# Patient Record
Sex: MALE | Race: WHITE | HISPANIC OR LATINO | Employment: OTHER | URBAN - METROPOLITAN AREA
[De-identification: names, ages, dates, MRNs, and addresses within clinical notes are randomized per-mention and may not be internally consistent; named-entity substitution may affect disease eponyms.]

---

## 2023-09-27 ENCOUNTER — ANESTHESIA (INPATIENT)
Dept: PERIOP | Facility: HOSPITAL | Age: 58
DRG: 263 | End: 2023-09-27
Payer: COMMERCIAL

## 2023-09-27 ENCOUNTER — APPOINTMENT (OUTPATIENT)
Dept: RADIOLOGY | Facility: HOSPITAL | Age: 58
DRG: 263 | End: 2023-09-27
Payer: COMMERCIAL

## 2023-09-27 ENCOUNTER — ANESTHESIA EVENT (INPATIENT)
Dept: PERIOP | Facility: HOSPITAL | Age: 58
DRG: 263 | End: 2023-09-27
Payer: COMMERCIAL

## 2023-09-27 ENCOUNTER — OFFICE VISIT (OUTPATIENT)
Dept: URGENT CARE | Facility: CLINIC | Age: 58
End: 2023-09-27

## 2023-09-27 ENCOUNTER — HOSPITAL ENCOUNTER (INPATIENT)
Facility: HOSPITAL | Age: 58
LOS: 1 days | Discharge: HOME | DRG: 263 | End: 2023-09-28
Attending: EMERGENCY MEDICINE | Admitting: SURGERY
Payer: COMMERCIAL

## 2023-09-27 ENCOUNTER — APPOINTMENT (EMERGENCY)
Dept: CT IMAGING | Facility: HOSPITAL | Age: 58
DRG: 263 | End: 2023-09-27
Payer: COMMERCIAL

## 2023-09-27 VITALS
TEMPERATURE: 99 F | DIASTOLIC BLOOD PRESSURE: 72 MMHG | RESPIRATION RATE: 16 BRPM | HEART RATE: 57 BPM | SYSTOLIC BLOOD PRESSURE: 152 MMHG | OXYGEN SATURATION: 97 %

## 2023-09-27 DIAGNOSIS — K81.0 ACUTE CHOLECYSTITIS: Primary | ICD-10-CM

## 2023-09-27 DIAGNOSIS — R93.5 ABNORMAL CT OF THE ABDOMEN: ICD-10-CM

## 2023-09-27 DIAGNOSIS — E11.9 TYPE 2 DIABETES MELLITUS WITHOUT COMPLICATION, WITHOUT LONG-TERM CURRENT USE OF INSULIN (HCC): ICD-10-CM

## 2023-09-27 DIAGNOSIS — I10 HYPERTENSION, UNSPECIFIED TYPE: ICD-10-CM

## 2023-09-27 DIAGNOSIS — R07.9 CHEST PAIN, UNSPECIFIED TYPE: Primary | ICD-10-CM

## 2023-09-27 DIAGNOSIS — N28.89 RENAL MASS: ICD-10-CM

## 2023-09-27 DIAGNOSIS — R10.11 RIGHT UPPER QUADRANT ABDOMINAL PAIN: ICD-10-CM

## 2023-09-27 DIAGNOSIS — K80.20 CHOLELITHIASES: ICD-10-CM

## 2023-09-27 DIAGNOSIS — M54.6 ACUTE MIDLINE THORACIC BACK PAIN: ICD-10-CM

## 2023-09-27 LAB
2HR DELTA HS TROPONIN: -1 NG/L
ALBUMIN SERPL BCP-MCNC: 4.5 G/DL (ref 3.5–5)
ALP SERPL-CCNC: 111 U/L (ref 34–104)
ALT SERPL W P-5'-P-CCNC: 24 U/L (ref 7–52)
ANION GAP SERPL CALCULATED.3IONS-SCNC: 9 MMOL/L
AST SERPL W P-5'-P-CCNC: 20 U/L (ref 13–39)
ATRIAL RATE: 51 BPM
ATRIAL RATE: 62 BPM
BASOPHILS # BLD AUTO: 0.06 THOUSANDS/ÂΜL (ref 0–0.1)
BASOPHILS NFR BLD AUTO: 0 % (ref 0–1)
BILIRUB SERPL-MCNC: 0.56 MG/DL (ref 0.2–1)
BUN SERPL-MCNC: 13 MG/DL (ref 5–25)
CALCIUM SERPL-MCNC: 9.9 MG/DL (ref 8.4–10.2)
CARDIAC TROPONIN I PNL SERPL HS: 10 NG/L
CARDIAC TROPONIN I PNL SERPL HS: 9 NG/L
CHLORIDE SERPL-SCNC: 102 MMOL/L (ref 96–108)
CO2 SERPL-SCNC: 27 MMOL/L (ref 21–32)
CREAT SERPL-MCNC: 0.78 MG/DL (ref 0.6–1.3)
EOSINOPHIL # BLD AUTO: 0.04 THOUSAND/ÂΜL (ref 0–0.61)
EOSINOPHIL NFR BLD AUTO: 0 % (ref 0–6)
ERYTHROCYTE [DISTWIDTH] IN BLOOD BY AUTOMATED COUNT: 11.8 % (ref 11.6–15.1)
GFR SERPL CREATININE-BSD FRML MDRD: 100 ML/MIN/1.73SQ M
GLUCOSE SERPL-MCNC: 159 MG/DL (ref 65–140)
GLUCOSE SERPL-MCNC: 162 MG/DL (ref 65–140)
GLUCOSE SERPL-MCNC: 180 MG/DL (ref 65–140)
HCT VFR BLD AUTO: 51.1 % (ref 36.5–49.3)
HGB BLD-MCNC: 17.7 G/DL (ref 12–17)
IMM GRANULOCYTES # BLD AUTO: 0.1 THOUSAND/UL (ref 0–0.2)
IMM GRANULOCYTES NFR BLD AUTO: 1 % (ref 0–2)
LYMPHOCYTES # BLD AUTO: 1.14 THOUSANDS/ÂΜL (ref 0.6–4.47)
LYMPHOCYTES NFR BLD AUTO: 6 % (ref 14–44)
MCH RBC QN AUTO: 31.5 PG (ref 26.8–34.3)
MCHC RBC AUTO-ENTMCNC: 34.6 G/DL (ref 31.4–37.4)
MCV RBC AUTO: 91 FL (ref 82–98)
MONOCYTES # BLD AUTO: 0.84 THOUSAND/ÂΜL (ref 0.17–1.22)
MONOCYTES NFR BLD AUTO: 4 % (ref 4–12)
NEUTROPHILS # BLD AUTO: 16.96 THOUSANDS/ÂΜL (ref 1.85–7.62)
NEUTS SEG NFR BLD AUTO: 89 % (ref 43–75)
NRBC BLD AUTO-RTO: 0 /100 WBCS
P AXIS: 62 DEGREES
P AXIS: 72 DEGREES
PLATELET # BLD AUTO: 255 THOUSANDS/UL (ref 149–390)
PMV BLD AUTO: 9.1 FL (ref 8.9–12.7)
POTASSIUM SERPL-SCNC: 3.7 MMOL/L (ref 3.5–5.3)
PR INTERVAL: 156 MS
PR INTERVAL: 156 MS
PROT SERPL-MCNC: 7.9 G/DL (ref 6.4–8.4)
QRS AXIS: 11 DEGREES
QRS AXIS: 179 DEGREES
QRSD INTERVAL: 142 MS
QRSD INTERVAL: 148 MS
QT INTERVAL: 462 MS
QT INTERVAL: 470 MS
QTC INTERVAL: 433 MS
QTC INTERVAL: 468 MS
RBC # BLD AUTO: 5.62 MILLION/UL (ref 3.88–5.62)
SODIUM SERPL-SCNC: 138 MMOL/L (ref 135–147)
T WAVE AXIS: 21 DEGREES
T WAVE AXIS: 36 DEGREES
VENTRICULAR RATE: 51 BPM
VENTRICULAR RATE: 62 BPM
WBC # BLD AUTO: 19.14 THOUSAND/UL (ref 4.31–10.16)

## 2023-09-27 PROCEDURE — 71260 CT THORAX DX C+: CPT

## 2023-09-27 PROCEDURE — 93010 ELECTROCARDIOGRAM REPORT: CPT | Performed by: INTERNAL MEDICINE

## 2023-09-27 PROCEDURE — 74177 CT ABD & PELVIS W/CONTRAST: CPT

## 2023-09-27 PROCEDURE — 47562 LAPAROSCOPIC CHOLECYSTECTOMY: CPT | Performed by: SURGERY

## 2023-09-27 PROCEDURE — 88304 TISSUE EXAM BY PATHOLOGIST: CPT | Performed by: SPECIALIST

## 2023-09-27 PROCEDURE — 93005 ELECTROCARDIOGRAM TRACING: CPT | Performed by: PHYSICIAN ASSISTANT

## 2023-09-27 PROCEDURE — 0FT44ZZ RESECTION OF GALLBLADDER, PERCUTANEOUS ENDOSCOPIC APPROACH: ICD-10-PCS | Performed by: SURGERY

## 2023-09-27 PROCEDURE — 85025 COMPLETE CBC W/AUTO DIFF WBC: CPT | Performed by: EMERGENCY MEDICINE

## 2023-09-27 PROCEDURE — 96374 THER/PROPH/DIAG INJ IV PUSH: CPT

## 2023-09-27 PROCEDURE — G0383 LEV 4 HOSP TYPE B ED VISIT: HCPCS | Performed by: PHYSICIAN ASSISTANT

## 2023-09-27 PROCEDURE — 82948 REAGENT STRIP/BLOOD GLUCOSE: CPT

## 2023-09-27 PROCEDURE — 99285 EMERGENCY DEPT VISIT HI MDM: CPT

## 2023-09-27 PROCEDURE — 47562 LAPAROSCOPIC CHOLECYSTECTOMY: CPT | Performed by: PHYSICIAN ASSISTANT

## 2023-09-27 PROCEDURE — 71046 X-RAY EXAM CHEST 2 VIEWS: CPT

## 2023-09-27 PROCEDURE — 93005 ELECTROCARDIOGRAM TRACING: CPT

## 2023-09-27 PROCEDURE — 80053 COMPREHEN METABOLIC PANEL: CPT | Performed by: EMERGENCY MEDICINE

## 2023-09-27 PROCEDURE — 99222 1ST HOSP IP/OBS MODERATE 55: CPT | Performed by: SURGERY

## 2023-09-27 PROCEDURE — 36415 COLL VENOUS BLD VENIPUNCTURE: CPT

## 2023-09-27 PROCEDURE — 84484 ASSAY OF TROPONIN QUANT: CPT | Performed by: EMERGENCY MEDICINE

## 2023-09-27 PROCEDURE — 96375 TX/PRO/DX INJ NEW DRUG ADDON: CPT

## 2023-09-27 RX ORDER — MAGNESIUM HYDROXIDE 1200 MG/15ML
LIQUID ORAL AS NEEDED
Status: DISCONTINUED | OUTPATIENT
Start: 2023-09-27 | End: 2023-09-27 | Stop reason: HOSPADM

## 2023-09-27 RX ORDER — HYDROMORPHONE HCL/PF 1 MG/ML
SYRINGE (ML) INJECTION AS NEEDED
Status: DISCONTINUED | OUTPATIENT
Start: 2023-09-27 | End: 2023-09-27

## 2023-09-27 RX ORDER — TRAMADOL HYDROCHLORIDE 50 MG/1
50 TABLET ORAL EVERY 6 HOURS PRN
Status: DISCONTINUED | OUTPATIENT
Start: 2023-09-27 | End: 2023-09-28 | Stop reason: HOSPADM

## 2023-09-27 RX ORDER — PROPOFOL 10 MG/ML
INJECTION, EMULSION INTRAVENOUS AS NEEDED
Status: DISCONTINUED | OUTPATIENT
Start: 2023-09-27 | End: 2023-09-27

## 2023-09-27 RX ORDER — LABETALOL HYDROCHLORIDE 5 MG/ML
10 INJECTION, SOLUTION INTRAVENOUS ONCE
Status: DISCONTINUED | OUTPATIENT
Start: 2023-09-27 | End: 2023-09-27 | Stop reason: HOSPADM

## 2023-09-27 RX ORDER — HEPARIN SODIUM 5000 [USP'U]/ML
5000 INJECTION, SOLUTION INTRAVENOUS; SUBCUTANEOUS EVERY 8 HOURS SCHEDULED
Status: DISCONTINUED | OUTPATIENT
Start: 2023-09-27 | End: 2023-09-28 | Stop reason: HOSPADM

## 2023-09-27 RX ORDER — OXYCODONE HYDROCHLORIDE 5 MG/1
5 TABLET ORAL EVERY 4 HOURS PRN
Status: DISCONTINUED | OUTPATIENT
Start: 2023-09-27 | End: 2023-09-28 | Stop reason: HOSPADM

## 2023-09-27 RX ORDER — ACETAMINOPHEN 325 MG/1
650 TABLET ORAL EVERY 6 HOURS SCHEDULED
Status: DISCONTINUED | OUTPATIENT
Start: 2023-09-27 | End: 2023-09-28 | Stop reason: HOSPADM

## 2023-09-27 RX ORDER — ONDANSETRON 2 MG/ML
4 INJECTION INTRAMUSCULAR; INTRAVENOUS ONCE AS NEEDED
Status: DISCONTINUED | OUTPATIENT
Start: 2023-09-27 | End: 2023-09-27 | Stop reason: HOSPADM

## 2023-09-27 RX ORDER — SODIUM CHLORIDE, SODIUM LACTATE, POTASSIUM CHLORIDE, CALCIUM CHLORIDE 600; 310; 30; 20 MG/100ML; MG/100ML; MG/100ML; MG/100ML
75 INJECTION, SOLUTION INTRAVENOUS CONTINUOUS
Status: DISCONTINUED | OUTPATIENT
Start: 2023-09-27 | End: 2023-09-28 | Stop reason: HOSPADM

## 2023-09-27 RX ORDER — FENTANYL CITRATE 50 UG/ML
INJECTION, SOLUTION INTRAMUSCULAR; INTRAVENOUS AS NEEDED
Status: DISCONTINUED | OUTPATIENT
Start: 2023-09-27 | End: 2023-09-27

## 2023-09-27 RX ORDER — AMLODIPINE BESYLATE 10 MG/1
10 TABLET ORAL DAILY
COMMUNITY

## 2023-09-27 RX ORDER — CEFAZOLIN SODIUM 2 G/50ML
2000 SOLUTION INTRAVENOUS
Status: COMPLETED | OUTPATIENT
Start: 2023-09-28 | End: 2023-09-27

## 2023-09-27 RX ORDER — HYDROMORPHONE HCL/PF 1 MG/ML
0.5 SYRINGE (ML) INJECTION EVERY 4 HOURS PRN
Status: DISCONTINUED | OUTPATIENT
Start: 2023-09-27 | End: 2023-09-28 | Stop reason: HOSPADM

## 2023-09-27 RX ORDER — AMLODIPINE BESYLATE 10 MG/1
10 TABLET ORAL DAILY
Status: DISCONTINUED | OUTPATIENT
Start: 2023-09-28 | End: 2023-09-28 | Stop reason: HOSPADM

## 2023-09-27 RX ORDER — MIDAZOLAM HYDROCHLORIDE 2 MG/2ML
INJECTION, SOLUTION INTRAMUSCULAR; INTRAVENOUS AS NEEDED
Status: DISCONTINUED | OUTPATIENT
Start: 2023-09-27 | End: 2023-09-27

## 2023-09-27 RX ORDER — ATORVASTATIN CALCIUM 10 MG/1
10 TABLET, FILM COATED ORAL DAILY
COMMUNITY

## 2023-09-27 RX ORDER — LIDOCAINE HYDROCHLORIDE 10 MG/ML
INJECTION, SOLUTION EPIDURAL; INFILTRATION; INTRACAUDAL; PERINEURAL AS NEEDED
Status: DISCONTINUED | OUTPATIENT
Start: 2023-09-27 | End: 2023-09-27

## 2023-09-27 RX ORDER — BUPIVACAINE HYDROCHLORIDE 2.5 MG/ML
INJECTION, SOLUTION EPIDURAL; INFILTRATION; INTRACAUDAL AS NEEDED
Status: DISCONTINUED | OUTPATIENT
Start: 2023-09-27 | End: 2023-09-27 | Stop reason: HOSPADM

## 2023-09-27 RX ORDER — LABETALOL HYDROCHLORIDE 5 MG/ML
INJECTION, SOLUTION INTRAVENOUS AS NEEDED
Status: DISCONTINUED | OUTPATIENT
Start: 2023-09-27 | End: 2023-09-27

## 2023-09-27 RX ORDER — DOCUSATE SODIUM 100 MG/1
100 CAPSULE, LIQUID FILLED ORAL 2 TIMES DAILY
Status: DISCONTINUED | OUTPATIENT
Start: 2023-09-27 | End: 2023-09-28 | Stop reason: HOSPADM

## 2023-09-27 RX ORDER — ONDANSETRON 2 MG/ML
INJECTION INTRAMUSCULAR; INTRAVENOUS AS NEEDED
Status: DISCONTINUED | OUTPATIENT
Start: 2023-09-27 | End: 2023-09-27

## 2023-09-27 RX ORDER — MORPHINE SULFATE 4 MG/ML
4 INJECTION, SOLUTION INTRAMUSCULAR; INTRAVENOUS ONCE
Status: COMPLETED | OUTPATIENT
Start: 2023-09-27 | End: 2023-09-27

## 2023-09-27 RX ORDER — HYDROMORPHONE HCL/PF 1 MG/ML
1 SYRINGE (ML) INJECTION ONCE
Status: COMPLETED | OUTPATIENT
Start: 2023-09-27 | End: 2023-09-27

## 2023-09-27 RX ORDER — ONDANSETRON 2 MG/ML
4 INJECTION INTRAMUSCULAR; INTRAVENOUS EVERY 6 HOURS PRN
Status: DISCONTINUED | OUTPATIENT
Start: 2023-09-27 | End: 2023-09-28 | Stop reason: HOSPADM

## 2023-09-27 RX ORDER — OXYCODONE HYDROCHLORIDE 10 MG/1
10 TABLET ORAL EVERY 4 HOURS PRN
Status: DISCONTINUED | OUTPATIENT
Start: 2023-09-27 | End: 2023-09-28 | Stop reason: HOSPADM

## 2023-09-27 RX ORDER — HYDRALAZINE HYDROCHLORIDE 20 MG/ML
5 INJECTION INTRAMUSCULAR; INTRAVENOUS EVERY 6 HOURS PRN
Status: DISCONTINUED | OUTPATIENT
Start: 2023-09-27 | End: 2023-09-28 | Stop reason: HOSPADM

## 2023-09-27 RX ORDER — HYDROMORPHONE HCL/PF 1 MG/ML
0.5 SYRINGE (ML) INJECTION
Status: DISCONTINUED | OUTPATIENT
Start: 2023-09-27 | End: 2023-09-27 | Stop reason: HOSPADM

## 2023-09-27 RX ORDER — FENTANYL CITRATE/PF 50 MCG/ML
50 SYRINGE (ML) INJECTION
Status: DISCONTINUED | OUTPATIENT
Start: 2023-09-27 | End: 2023-09-27 | Stop reason: HOSPADM

## 2023-09-27 RX ORDER — ROCURONIUM BROMIDE 10 MG/ML
INJECTION, SOLUTION INTRAVENOUS AS NEEDED
Status: DISCONTINUED | OUTPATIENT
Start: 2023-09-27 | End: 2023-09-27

## 2023-09-27 RX ADMIN — ROCURONIUM BROMIDE 40 MG: 10 INJECTION, SOLUTION INTRAVENOUS at 17:19

## 2023-09-27 RX ADMIN — SODIUM CHLORIDE, SODIUM LACTATE, POTASSIUM CHLORIDE, AND CALCIUM CHLORIDE: .6; .31; .03; .02 INJECTION, SOLUTION INTRAVENOUS at 17:00

## 2023-09-27 RX ADMIN — HEPARIN SODIUM 5000 UNITS: 5000 INJECTION INTRAVENOUS; SUBCUTANEOUS at 22:16

## 2023-09-27 RX ADMIN — HEPARIN SODIUM 5000 UNITS: 5000 INJECTION INTRAVENOUS; SUBCUTANEOUS at 17:04

## 2023-09-27 RX ADMIN — ROCURONIUM BROMIDE 10 MG: 10 INJECTION, SOLUTION INTRAVENOUS at 17:36

## 2023-09-27 RX ADMIN — PROPOFOL 50 MG: 10 INJECTION, EMULSION INTRAVENOUS at 17:38

## 2023-09-27 RX ADMIN — SODIUM CHLORIDE, SODIUM LACTATE, POTASSIUM CHLORIDE, AND CALCIUM CHLORIDE: .6; .31; .03; .02 INJECTION, SOLUTION INTRAVENOUS at 18:00

## 2023-09-27 RX ADMIN — HYDROMORPHONE HYDROCHLORIDE 0.5 MG: 1 INJECTION, SOLUTION INTRAMUSCULAR; INTRAVENOUS; SUBCUTANEOUS at 17:37

## 2023-09-27 RX ADMIN — LIDOCAINE HYDROCHLORIDE 40 MG: 10 INJECTION, SOLUTION EPIDURAL; INFILTRATION; INTRACAUDAL; PERINEURAL at 17:19

## 2023-09-27 RX ADMIN — LABETALOL HYDROCHLORIDE 5 MG: 5 INJECTION, SOLUTION INTRAVENOUS at 17:41

## 2023-09-27 RX ADMIN — MIDAZOLAM HYDROCHLORIDE 2 MG: 1 INJECTION, SOLUTION INTRAMUSCULAR; INTRAVENOUS at 17:10

## 2023-09-27 RX ADMIN — OXYCODONE HYDROCHLORIDE 5 MG: 5 TABLET ORAL at 20:02

## 2023-09-27 RX ADMIN — ACETAMINOPHEN 650 MG: 325 TABLET, FILM COATED ORAL at 23:50

## 2023-09-27 RX ADMIN — ONDANSETRON 4 MG: 2 INJECTION INTRAMUSCULAR; INTRAVENOUS at 17:28

## 2023-09-27 RX ADMIN — SUGAMMADEX 200 MG: 100 INJECTION, SOLUTION INTRAVENOUS at 17:57

## 2023-09-27 RX ADMIN — PROPOFOL 50 MG: 10 INJECTION, EMULSION INTRAVENOUS at 17:52

## 2023-09-27 RX ADMIN — IOHEXOL 100 ML: 350 INJECTION, SOLUTION INTRAVENOUS at 11:27

## 2023-09-27 RX ADMIN — FENTANYL CITRATE 100 MCG: 50 INJECTION, SOLUTION INTRAMUSCULAR; INTRAVENOUS at 17:19

## 2023-09-27 RX ADMIN — MORPHINE SULFATE 4 MG: 4 INJECTION INTRAVENOUS at 11:22

## 2023-09-27 RX ADMIN — LABETALOL HYDROCHLORIDE 5 MG: 5 INJECTION, SOLUTION INTRAVENOUS at 17:49

## 2023-09-27 RX ADMIN — CEFAZOLIN SODIUM 2000 MG: 2 SOLUTION INTRAVENOUS at 17:22

## 2023-09-27 RX ADMIN — HYDROMORPHONE HYDROCHLORIDE 1 MG: 1 INJECTION, SOLUTION INTRAMUSCULAR; INTRAVENOUS; SUBCUTANEOUS at 11:59

## 2023-09-27 RX ADMIN — PROPOFOL 200 MG: 10 INJECTION, EMULSION INTRAVENOUS at 17:19

## 2023-09-27 NOTE — ANESTHESIA POSTPROCEDURE EVALUATION
Post-Op Assessment Note    CV Status:  Stable  Pain Score: 0    Pain management: adequate     Mental Status:  Alert and awake   Hydration Status:  Euvolemic   PONV Controlled:  Controlled   Airway Patency:  Patent and adequate   Two or more mitigation strategies used for obstructive sleep apnea   Post Op Vitals Reviewed: Yes      Staff: CRNA         No notable events documented.     BP  155/73   Temp   98.2   Pulse 80   Resp 20   SpO2 100

## 2023-09-27 NOTE — TELEMEDICINE
e-Consult ([facility])  - Interventional Radiology  Augustus Snider 63 y.o. male MRN: [de-identified]  Unit/Bed#: [facility] Encounter: [id number]          Interventional Radiology has been consulted to evaluate Augustus Snider    We were consulted by general surgery concerning this patient with acute cholecystitis. Inpatient Consult to IR  Consult performed by: Angie Marquez MD  Consult ordered by: Audra García PA-C        09/27/23    Assessment/Recommendation:   55-year-old male who was awakened in the middle of the night with epigastric pain radiating to the right upper quadrant. Imaging obtained upon arrival to the emergency department demonstrates a stone in the neck of the gallbladder with some distention of the gallbladder and a minimal amount of stranding in the pericholecystic fat. The gallbladder wall does not appear thickened, however. In light of his elevated white blood cell count which is above 19,000, the diagnosis of acute cholecystitis is highly likely secondary to this obstructing gallbladder neck stone. The patient is noted to have a left renal mass as well as several densely sclerotic bone lesions. Surgery was initially concerned about metastatic renal cell carcinoma, however, renal cell carcinoma produces lytic bone lesions due to its high rate of growth. This patient very likely has a left renal cell carcinoma, but based on this imaging study it is probably a stage II. Sclerotic bone lesions are more likely secondary to a more slowly growing cancer, most likely a prostate cancer, or possibly multiple bone islands. This was discussed with Dr. Joy Drummond, and I recommended against a cholecystostomy in this patient with acute symptoms since he likely has a much longer life expectancy than a patient with stage IV renal cell carcinoma. Dr. Joy Drummond was in agreement. 11-20 minutes, >50% of the total time devoted to medical consultative verbal/EMR discussion between providers.  Written report will be generated in the EMR. Thank you for allowing Interventional Radiology to participate in the care of Kristina Cisneros. Please don't hesitate to call or TigerText us with any questions.      Rach Mcrae MD

## 2023-09-27 NOTE — PROGRESS NOTES
[facility]        NAME: Marionette Aschoff is a 63 y.o. male  : 1965    MRN: [id number]  DATE: 2023  TIME: 10:07 AM    Assessment and Plan   Chest pain, unspecified type [R07.9]  1. Chest pain, unspecified type  Transfer to other facility    ECG 12 lead      2. Acute midline thoracic back pain  Transfer to other facility    ECG 12 lead      3. Right upper quadrant abdominal pain  Transfer to other facility    ECG 12 lead            Patient Instructions     Your EKG was abnormal and I have no prior EKGs to compare. I recommend going to the nearest emergency department for further care and evaluation. Patient declined ambulance at this time and will have his daughter drive him to the [address] [facility]. Chief Complaint     Chief Complaint   Patient presents with   • Back Pain     Started this morning. No SOB or difficulty breathing. Lower back pain radiating to chest. Denies pain radiating to neck or left arm. History of Present Illness       Patient is a 81-year-old German-speaking male visiting from [facility] who is presenting with his daughter and complaint of mid thoracic pain that radiates to the chest since 3 AM this morning. Patient describes the pain as aching and dull, localized to the chest, not alleviated with ibuprofen. Associated symptoms is nausea. The patient denies shortness of breath diaphoresis dyspnea, cough, tachypnea, fatigue, numbness, paresthesia, weakness, vomiting, dizziness, lightheadedness. Patient has a past medical history of DM 2, hypertension, and dyslipidemia. Review of Systems   Review of Systems   Constitutional: Negative for activity change, appetite change, chills, fatigue and fever. HENT: Negative for congestion. Eyes: Negative for visual disturbance. Respiratory: Negative for cough and wheezing. Cardiovascular: Negative for palpitations. Gastrointestinal: Positive for abdominal pain. Musculoskeletal: Negative for arthralgias and myalgias. Skin: Negative for color change and pallor. Neurological: Negative for facial asymmetry. Current Medications       Current Outpatient Medications:   •  amLODIPine (NORVASC) 10 mg tablet, Take 10 mg by mouth daily, Disp: , Rfl:   •  atorvastatin (LIPITOR) 10 mg tablet, Take 10 mg by mouth daily, Disp: , Rfl:   •  metFORMIN (GLUCOPHAGE) 500 mg tablet, Take 500 mg by mouth 2 (two) times a day with meals, Disp: , Rfl:     Current Allergies     Allergies as of 09/27/2023   • (No Known Allergies)            The following portions of the patient's history were reviewed and updated as appropriate: allergies, current medications, past family history, past medical history, past social history, past surgical history and problem list.     Past Medical History:   Diagnosis Date   • Diabetes type 2, controlled ([address])    • Hyperlipidemia    • Hypertension        History reviewed. No pertinent surgical history. History reviewed. No pertinent family history. Medications have been verified. Objective   /72   Pulse 57   Temp 99 °F (37.2 °C)   Resp 16   SpO2 97%        Physical Exam     Physical Exam  Vitals and nursing note reviewed. Constitutional:       General: He is not in acute distress. Appearance: Normal appearance. He is ill-appearing. He is not toxic-appearing. HENT:      Nose: Nose normal.      Mouth/Throat:      Mouth: Mucous membranes are moist.      Pharynx: Oropharynx is clear. Eyes:      General: No scleral icterus. Extraocular Movements: Extraocular movements intact. Conjunctiva/sclera: Conjunctivae normal.      Pupils: Pupils are equal, round, and reactive to light. Cardiovascular:      Rate and Rhythm: Normal rate. Rhythm irregular. Pulses: Normal pulses. Heart sounds: Normal heart sounds. Pulmonary:      Effort: Pulmonary effort is normal. No respiratory distress.       Breath sounds: Normal breath sounds. Abdominal:      General: Abdomen is flat. Bowel sounds are normal.      Palpations: Abdomen is soft. There is no mass. Tenderness: There is abdominal tenderness. There is no right CVA tenderness, left CVA tenderness, guarding or rebound. Musculoskeletal:         General: Tenderness present. Normal range of motion. Cervical back: Normal range of motion and neck supple. Skin:     General: Skin is warm and dry. Findings: No erythema or lesion. Neurological:      General: No focal deficit present. Mental Status: He is alert and oriented to person, place, and time. Coordination: Coordination normal.      Gait: Gait normal.   Psychiatric:         Mood and Affect: Mood normal.         Behavior: Behavior normal.         Thought Content: Thought content normal.         Judgment: Judgment normal.         ECG: RBBB with PACs, vent at 62 bpm, NAD noted.

## 2023-09-27 NOTE — H&P
H&P Exam - General Surgery   Val Renner 63 y.o. male MRN: [de-identified]  Unit/Bed#: Z1H1 Encounter: [id number]    Assessment/Plan     Assessment:  71-year-old male with acute cholecystitis  -Imaging shows prominent gallstone in the gallbladder neck with gallbladder distention and questionable pericholecystic stranding surrounding the gallbladder neck    Solid Left renal cyst measuring 3.7 cm which is consistent with renal cell carcinoma  -Imaging shows scattered sclerotic foci seen throughout the osseous structures which could represent metastatic disease    Diabetes    Hypertension    Hyperlipidemia    BPH    Plan:  -We will admit under surgical services  -Consult interventional radiology for evaluation of images with distended gallbladder   -Consult urology for further recommendations on renal cyst and possible metastatic disease  -Pending on urology and IR recommendations, will consider tentative laparoscopic cholecystectomy versus percutaneous cholecystostomy tube placement  -Patient is currently visiting from [facility], so once patient is stabilized from acute cholecystitis patient will be allowed to travel back to seek surgical and medical attention back in his home country  -Start IV antibiotics  -Analgesics and antiemetics as needed  -Abdominal exams  -Contine to monitor vitals and lab results    History of Present Illness     HPI:  Val Renner is a 63 y.o. male past medical history hypertension, diabetes, hyperlipidemia, BPH, who presents to the emergency department complaining of acute onset of epigastric pain radiating to the right upper quadrant associated with nausea that began around 3 AM.  Patient reports taking ibuprofen which did not help alleviate the symptoms. Patient presented to urgent care where an EKG was done and patient was referred to the emergency department for further evaluation. Review of Systems   Constitutional: Negative for chills, fatigue and fever.    HENT: Negative for congestion, sore throat and trouble swallowing. Eyes: Negative for photophobia, redness and visual disturbance. Respiratory: Negative for apnea, cough and wheezing. Cardiovascular: Negative for chest pain, palpitations and leg swelling. Gastrointestinal: Negative for abdominal distention, abdominal pain and anal bleeding. Endocrine: Negative for polydipsia, polyphagia and polyuria. Genitourinary: Negative for dysuria, frequency, hematuria and urgency. Musculoskeletal: Negative for arthralgias, back pain and gait problem. Skin: Negative for color change, pallor and rash. Neurological: Negative for dizziness, seizures and numbness. Psychiatric/Behavioral: Negative for agitation, behavioral problems and confusion. Historical Information   Past Medical History:   Diagnosis Date   • Diabetes type 2, controlled ([address])    • Hyperlipidemia    • Hypertension      History reviewed. No pertinent surgical history.   Social History   Social History     Substance and Sexual Activity   Alcohol Use Yes    Comment: socially only     Social History     Substance and Sexual Activity   Drug Use Not on file     Social History     Tobacco Use   Smoking Status Some Days   • Types: Cigarettes   Smokeless Tobacco Never     E-Cigarette/Vaping   • E-Cigarette Use Never User      E-Cigarette/Vaping Substances     Family History: non-contributory    Meds/Allergies   all medications and allergies reviewed  No Known Allergies    Objective   First Vitals:   Blood Pressure: (!) 177/73 (09/27/23 1023)  Pulse: (!) 53 (09/27/23 1023)  Temperature: 98.5 °F (36.9 °C) (09/27/23 1023)  Respirations: 16 (09/27/23 1023)  Height: 5' 10.47" (179 cm) (09/27/23 1023)  SpO2: 99 % (09/27/23 1023)    Current Vitals:   Blood Pressure: 131/69 (09/27/23 1314)  Pulse: 75 (09/27/23 1314)  Temperature: 98.5 °F (36.9 °C) (09/27/23 1023)  Respirations: 16 (09/27/23 1314)  Height: 5' 10.47" (179 cm) (09/27/23 1023)  SpO2: 97 % (09/27/23 1314)    No intake or output data in the 24 hours ending 09/27/23 1432    Invasive Devices     Peripheral Intravenous Line  Duration           Peripheral IV 09/27/23 Distal;Left;Upper;Ventral (anterior) Arm <1 day                Physical Exam  Constitutional:       Appearance: Normal appearance. HENT:      Head: Normocephalic. Nose: Nose normal.      Mouth/Throat:      Mouth: Mucous membranes are moist.   Eyes:      Pupils: Pupils are equal, round, and reactive to light. Cardiovascular:      Rate and Rhythm: Normal rate and regular rhythm. Pulmonary:      Effort: Pulmonary effort is normal.      Breath sounds: Normal breath sounds. Abdominal:      General: Bowel sounds are normal.      Tenderness: There is abdominal tenderness. There is guarding. Musculoskeletal:         General: Normal range of motion. Cervical back: Normal range of motion and neck supple. Skin:     General: Skin is warm. Capillary Refill: Capillary refill takes less than 2 seconds. Neurological:      General: No focal deficit present. Mental Status: He is alert and oriented to person, place, and time. Psychiatric:         Mood and Affect: Mood normal.         Thought Content: Thought content normal.         Judgment: Judgment normal.         Lab Results:   I have personally reviewed pertinent lab results.   , CBC:   Lab Results   Component Value Date    WBC 19.14 (H) 09/27/2023    HGB 17.7 (H) 09/27/2023    HCT 51.1 (H) 09/27/2023    MCV 91 09/27/2023     09/27/2023    RBC 5.62 09/27/2023    MCH 31.5 09/27/2023    MCHC 34.6 09/27/2023    RDW 11.8 09/27/2023    MPV 9.1 09/27/2023    NRBC 0 09/27/2023   , CMP:   Lab Results   Component Value Date    SODIUM 138 09/27/2023    K 3.7 09/27/2023     09/27/2023    CO2 27 09/27/2023    BUN 13 09/27/2023    CREATININE 0.78 09/27/2023    CALCIUM 9.9 09/27/2023    AST 20 09/27/2023    ALT 24 09/27/2023    ALKPHOS 111 (H) 09/27/2023    EGFR 100 09/27/2023 Imaging: I have personally reviewed pertinent reports. and I have personally reviewed pertinent films in PACS  EKG, Pathology, and Other Studies: I have personally reviewed pertinent reports. and I have personally reviewed pertinent films in PACS    Code Status: No Order  Advance Directive and Living Will:      Power of :    POLST:      Counseling / Coordination of Care  Total floor / unit time spent today 30 minutes. Greater than 50% of total time was spent with the patient and / or family counseling and / or coordination of care. A description of the counseling / coordination of care: Plan of care and expectations.

## 2023-09-27 NOTE — ANESTHESIA PREPROCEDURE EVALUATION
Procedure:  CHOLECYSTECTOMY LAPAROSCOPIC (Abdomen)    Relevant Problems   CARDIO   (+) Primary hypertension      ENDO   (+) Type 2 diabetes mellitus without complication (HCC)      Digestive   (+) Acute cholecystitis        Physical Exam    Airway    Mallampati score: II  TM Distance: >3 FB  Neck ROM: full     Dental   No notable dental hx     Cardiovascular  Rhythm: regular, Rate: normal, Cardiovascular exam normal    Pulmonary  Pulmonary exam normal Breath sounds clear to auscultation, No rhonchi, No wheezes, No rales,     Other Findings        Anesthesia Plan  ASA Score- 2 Emergent    Anesthesia Type- general with ASA Monitors. Additional Monitors:   Airway Plan: ETT. Plan Factors-Exercise tolerance (METS): >4 METS. EKG reviewed. Imaging results reviewed. Existing labs reviewed. Patient summary reviewed. Patient is not a current smoker. Patient did not smoke on day of surgery. Obstructive sleep apnea risk education given perioperatively. Induction- intravenous. Postoperative Plan-     Informed Consent- Anesthetic plan and risks discussed with patient, spouse and son (With ). I personally reviewed this patient with the CRNA. Discussed and agreed on the Anesthesia Plan with the CRNA. Reddy Zambrano

## 2023-09-27 NOTE — ED PROVIDER NOTES
History  Chief Complaint   Patient presents with   • Chest Pain     Pt with chest pain and right sided back pain that started this morning, was sent over by urgent care. Patient is a 43-year-old male with a past medical history of type 2 diabetes, hyperlipidemia and hypertension presented to the emergency department for evaluation of epigastric pain. Patient states he woke up around 2 AM this morning with epigastric pain radiating to his right upper quadrant. Patient states he feels as though it is radiating from his back. Patient does state he has chronic back pain but states this pain feels worse. Patient states he did attempt to take an analgesic this morning but vomited up shortly after. Patient states he did drink some water this morning and did not note any change in his pain. Denies fevers, chills, rash, headache, weakness, dizziness, visual changes, diarrhea, constipation, shortness of breath or difficulty breathing. Does not offer any other concerns or complaints. Prior to Admission Medications   Prescriptions Last Dose Informant Patient Reported? Taking? amLODIPine (NORVASC) 10 mg tablet   Yes No   Sig: Take 10 mg by mouth daily   atorvastatin (LIPITOR) 10 mg tablet   Yes No   Sig: Take 10 mg by mouth daily   metFORMIN (GLUCOPHAGE) 500 mg tablet   Yes No   Sig: Take 500 mg by mouth 2 (two) times a day with meals      Facility-Administered Medications: None       Past Medical History:   Diagnosis Date   • Diabetes type 2, controlled ([address])    • Hyperlipidemia    • Hypertension        History reviewed. No pertinent surgical history. History reviewed. No pertinent family history. I have reviewed and agree with the history as documented.     E-Cigarette/Vaping   • E-Cigarette Use Never User      E-Cigarette/Vaping Substances     Social History     Tobacco Use   • Smoking status: Some Days     Types: Cigarettes   • Smokeless tobacco: Never   Vaping Use   • Vaping Use: Never used Substance Use Topics   • Alcohol use: Yes     Comment: socially only       Review of Systems   Constitutional: Negative for chills and fever. HENT: Negative for ear pain and sore throat. Eyes: Negative for pain and visual disturbance. Respiratory: Negative for cough and shortness of breath. Cardiovascular: Negative for chest pain and palpitations. Gastrointestinal: Positive for abdominal pain (epigastric) and vomiting. Negative for diarrhea and nausea. Genitourinary: Negative for dysuria and hematuria. Musculoskeletal: Positive for back pain. Negative for arthralgias. Skin: Negative for color change and rash. Neurological: Negative for seizures and syncope. All other systems reviewed and are negative. Physical Exam  Physical Exam  Vitals and nursing note reviewed. Constitutional:       General: He is not in acute distress. Appearance: Normal appearance. He is well-developed. He is not toxic-appearing or diaphoretic. HENT:      Head: Normocephalic and atraumatic. Right Ear: External ear normal.      Left Ear: External ear normal.      Nose: Nose normal.      Mouth/Throat:      Mouth: Mucous membranes are moist.   Eyes:      General: No scleral icterus. Right eye: No discharge. Left eye: No discharge. Conjunctiva/sclera: Conjunctivae normal.   Cardiovascular:      Rate and Rhythm: Normal rate and regular rhythm. Heart sounds: No murmur heard. Pulmonary:      Effort: Pulmonary effort is normal. No respiratory distress. Breath sounds: Normal breath sounds. Abdominal:      Palpations: Abdomen is soft. Tenderness: There is abdominal tenderness in the epigastric area. Musculoskeletal:         General: No swelling, deformity or signs of injury. Normal range of motion. Cervical back: Normal range of motion and neck supple. No rigidity. Back:    Skin:     General: Skin is warm and dry.       Capillary Refill: Capillary refill takes less than 2 seconds. Coloration: Skin is not jaundiced. Findings: No erythema or rash. Neurological:      General: No focal deficit present. Mental Status: He is alert and oriented to person, place, and time. Mental status is at baseline. Cranial Nerves: No cranial nerve deficit. Gait: Gait normal.   Psychiatric:         Mood and Affect: Mood normal.         Behavior: Behavior normal.         Thought Content:  Thought content normal.         Judgment: Judgment normal.         Vital Signs  ED Triage Vitals   Temperature Pulse Respirations Blood Pressure SpO2   09/27/23 1023 09/27/23 1023 09/27/23 1023 09/27/23 1023 09/27/23 1023   98.5 °F (36.9 °C) (!) 53 16 (!) 177/73 99 %      Temp src Heart Rate Source Patient Position - Orthostatic VS BP Location FiO2 (%)   -- 09/27/23 1314 09/27/23 1150 09/27/23 1150 --    Monitor Lying Left arm       Pain Score       09/27/23 1122       10 - Worst Possible Pain           Vitals:    09/27/23 1023 09/27/23 1150 09/27/23 1314   BP: (!) 177/73 (!) 182/61 131/69   Pulse: (!) 53 64 75   Patient Position - Orthostatic VS:  Lying Lying         Visual Acuity      ED Medications  Medications   morphine injection 4 mg (4 mg Intravenous Given 9/27/23 1122)   iohexol (OMNIPAQUE) 350 MG/ML injection (MULTI-DOSE) 100 mL (100 mL Intravenous Given 9/27/23 1127)   HYDROmorphone (DILAUDID) injection 1 mg (1 mg Intravenous Given 9/27/23 1159)       Diagnostic Studies  Results Reviewed     Procedure Component Value Units Date/Time    HS Troponin I 2hr [[id number]]  (Normal) Collected: 09/27/23 1210    Lab Status: Final result Specimen: Blood from Arm, Left Updated: 09/27/23 1249     hs TnI 2hr 9 ng/L      Delta 2hr hsTnI -1 ng/L     HS Troponin I 4hr [[id number]]     Lab Status: No result Specimen: Blood     HS Troponin 0hr (reflex protocol) [[id number]]  (Normal) Collected: 09/27/23 1025    Lab Status: Final result Specimen: Blood from Arm, Right Updated: 09/27/23 1101 hs TnI 0hr 10 ng/L     Comprehensive metabolic panel [[id number]]  (Abnormal) Collected: 09/27/23 1025    Lab Status: Final result Specimen: Blood from Arm, Right Updated: 09/27/23 1055     Sodium 138 mmol/L      Potassium 3.7 mmol/L      Chloride 102 mmol/L      CO2 27 mmol/L      ANION GAP 9 mmol/L      BUN 13 mg/dL      Creatinine 0.78 mg/dL      Glucose 162 mg/dL      Calcium 9.9 mg/dL      AST 20 U/L      ALT 24 U/L      Alkaline Phosphatase 111 U/L      Total Protein 7.9 g/dL      Albumin 4.5 g/dL      Total Bilirubin 0.56 mg/dL      eGFR 100 ml/min/1.73sq m     Narrative:      National Kidney Disease Foundation guidelines for Chronic Kidney Disease (CKD):   •  Stage 1 with normal or high GFR (GFR > 90 mL/min/1.73 square meters)  •  Stage 2 Mild CKD (GFR = 60-89 mL/min/1.73 square meters)  •  Stage 3A Moderate CKD (GFR = 45-59 mL/min/1.73 square meters)  •  Stage 3B Moderate CKD (GFR = 30-44 mL/min/1.73 square meters)  •  Stage 4 Severe CKD (GFR = 15-29 mL/min/1.73 square meters)  •  Stage 5 End Stage CKD (GFR <15 mL/min/1.73 square meters)  Note: GFR calculation is accurate only with a steady state creatinine    CBC and differential [[id number]]  (Abnormal) Collected: 09/27/23 1025    Lab Status: Final result Specimen: Blood from Arm, Right Updated: 09/27/23 1033     WBC 19.14 Thousand/uL      RBC 5.62 Million/uL      Hemoglobin 17.7 g/dL      Hematocrit 51.1 %      MCV 91 fL      MCH 31.5 pg      MCHC 34.6 g/dL      RDW 11.8 %      MPV 9.1 fL      Platelets 811 Thousands/uL      nRBC 0 /100 WBCs      Neutrophils Relative 89 %      Immat GRANS % 1 %      Lymphocytes Relative 6 %      Monocytes Relative 4 %      Eosinophils Relative 0 %      Basophils Relative 0 %      Neutrophils Absolute 16.96 Thousands/µL      Immature Grans Absolute 0.10 Thousand/uL      Lymphocytes Absolute 1.14 Thousands/µL      Monocytes Absolute 0.84 Thousand/µL      Eosinophils Absolute 0.04 Thousand/µL      Basophils Absolute 0.06 Thousands/µL                  CT chest abdomen pelvis w contrast   Final Result by Joshua Cabral MD (09/27 1300)      Prominent gallstone in the gallbladder neck with gallbladder distention and suggestion of questionable pericholecystic stranding surrounding the gallbladder neck. In this patient with right upper quadrant abdominal pain further investigation with right    upper quadrant ultrasound is recommended. Incidental discovery of cystic and solid 3.7 cm exophytic mid to lower pole left renal mass most consistent with renal cell carcinoma. Atypical appearance of right renal nephrogram with relative hypoenhancement of renal pyramids. This is a nonspecific finding that can be seen in the setting of pyelonephritis. Follow-up urinalysis is recommended. Marked prostatomegaly. Heterogeneous enhancement of prostate. Scattered sclerotic foci seen throughout the osseous structures which could represent numerous bone islands but the possibility of sclerotic osseous metastatic disease must be considered. A follow-up nonemergent bone scan is recommended. I reported these results to [facility] via HIPAA compliant secure electronic messaging on 9/27/2023 12:59 PM.      Workstation performed: [id number]         XR chest pa & lateral   Final Result by Brie Valdez MD (09/27 1054)      No acute cardiopulmonary disease. Workstation performed: [id number]                    Procedures  Procedures         ED Course  ED Course as of 09/27/23 1427   Wed Sep 27, 2023   1108 hs TnI 0hr: 10   1108 WBC(!): 19.14             Medical Decision Making    This is a 52-year-old male with a past medical history of type 2 diabetes, hyperlipidemia and hypertension presented to the emergency department for evaluation of epigastric pain. Patient states he woke up around 2 AM this morning with epigastric pain radiating to his right upper quadrant.   Patient states he feels as though it is radiating from his back. Patient does state he has chronic back pain but states this pain feels worse. Patient states he did attempt to take an analgesic this morning but vomited up shortly after. Patient states he did drink some water this morning and did not note any change in his pain. Patient appears uncomfortable on initial examination. Differential diagnosis to include but is not limited to: Cholecystitis, cholelithiasis, ACS, STEMI, arrhythmia, pneumonia    Initial ED Plan: imaging, labs, ekg    ED results:  Prominent gallstone in the gallbladder neck with gallbladder distention and suggestion of questionable pericholecystic stranding surrounding the gallbladder neck. In this patient with right upper quadrant abdominal pain further investigation with right   upper quadrant ultrasound is recommended. Incidental discovery of cystic and solid 3.7 cm exophytic mid to lower pole left renal mass most consistent with renal cell carcinoma. Atypical appearance of right renal nephrogram with relative hypoenhancement of renal pyramids. This is a nonspecific finding that can be seen in the setting of pyelonephritis. Follow-up urinalysis is recommended. Marked prostatomegaly. Heterogeneous enhancement of prostate. Scattered sclerotic foci seen throughout the osseous structures which could represent numerous bone islands but the possibility of sclerotic osseous metastatic disease must be considered. A follow-up nonemergent bone scan is recommended.  -Patient reported a history of a kidney cyst but denies any known cancer. Discussed results at length with the patient.     0 hour troponin: 10  Heart score: 5    Discussed with Dr. Hellen Mac, who came down and discussed the procedure with the patient. Patient agreeable to stay for surgery. Final ED assessment: Patient is stable and well appearing. Discussed radiologic studies and laboratory results.  Patient verbalized understanding and is agreeable with the plan for admission. Dr. Shailesh Ennis, bridging orders placed. Amount and/or Complexity of Data Reviewed  Labs: ordered. Decision-making details documented in ED Course. Radiology: ordered. Risk  Prescription drug management. Decision regarding hospitalization. Disposition  Final diagnoses:   Cholelithiases   Abnormal CT of the abdomen     Time reflects when diagnosis was documented in both MDM as applicable and the Disposition within this note     Time User Action Codes Description Comment    9/27/2023  1:44 PM [facility] Add [K81.0] Acute cholecystitis     9/27/2023  1:47 PM [facility] Add [K80.20] Cholelithiases     9/27/2023  1:47 PM [facility] Add [R93.5] Abnormal CT of the abdomen       ED Disposition     ED Disposition   Admit    Condition   Stable    Date/Time   Wed Sep 27, 2023  1:47 PM    Comment   Case was discussed with Dr. Shailesh Ennis and the patient's admission status was agreed to be Admission Status: inpatient status to the service of Dr. Shailesh Ennis . Follow-up Information    None         Patient's Medications   Discharge Prescriptions    No medications on file       No discharge procedures on file.     PDMP Review     None          ED Provider  Electronically Signed by           Jimena Kauffman PA-C  09/27/23 9729

## 2023-09-27 NOTE — OP NOTE
OPERATIVE REPORT  PATIENT NAME: Genie Zhu    :  1965  MRN: [id number]  Pt Location: [facility] OR ROOM 03    SURGERY DATE: 2023    Surgeon(s) and Role:     * Tamy Sandhu MD - Primary     * Pamela Fuller PA-C - Assisting    Preop Diagnosis:  Acute cholecystitis [K81.0]    Post-Op Diagnosis Codes:     * Acute cholecystitis [K81.0]    Procedure(s):  CHOLECYSTECTOMY LAPAROSCOPIC    Specimen(s):  ID Type Source Tests Collected by Time Destination   1 : Gallbladder and Contents Tissue Gallbladder TISSUE EXAM Tamy Sandhu MD 2023 1742        Estimated Blood Loss:   Minimal    Drains:  None    Anesthesia Type:   General    Operative Indications:  Acute cholecystitis [K81.0]    Operative Findings:  Gallbladder was markedly distended, tense with gangrenous changes throughout, no evidence of perforation. Adhesions between gallbladder and omentum. Liver surface appeared normal.  The rest of the abdominal cavity showed no evidence of inflammatory or neoplastic process. Complications:   None    Procedure and Technique:  The patient was identified and the patient was placed in the operating table in a supine position. After adequate esthesia induction and satisfactory endotracheal intubation the abdomen was prepped and draped in sterile usual fashion with ChloraPrep. Timeout was called the patient was identified as postsurgical site. Abdominal wall was elevated with towel clips, and incision was made through umbilicus and 5 mm trocar was introduced, after verifying the positioning the abdomen was insufflated with CO2. After obtaining adequate pneumoperitoneum the scope was advanced and exploration was performed with above findings. 11 mm trocar was placed in the epigastric area, 5 mm trocar the midclavicular and anterior axillary line, all the trochars were inserted under direct vision.   The fundus of the gallbladder was grasped and pulled towards the right shoulder, the adhesions were carefully taken down using cautery and dissector. Ryan's pouch was grasped and pulled towards the right side, the cystic duct was identified, dissected and stapled proximally x2 and distally then divided with scissors. Cystic artery was also identified, dissected and the stapled proximally and distally and then divided with scissors. Gallbladder was removed from the gallbladder fossa using cautery. Gallbladder was retrieved through the epigastric port site with the help of the Endo Catch. The abdominal cavity was copiously irrigated with saline solution. Gallbladder fossa was dry without evidence of bleeding or bile leak. Cystic duct and cystic artery were reinspected with no evidence of bile leak or bleeding respectively. Ports were removed under direct vision without evidence of bleeding from abdominal wall. Epigastric port site fascia was closed with 0 Vicryl interrupted figure-of-eight fashion. Subcutaneous tissue on all the incisions were infiltrated with 0.25% of Marcaine and the skin was closed with 4-0 Vicryl in an interrupted subcuticular fashion on all the incisions. At the end of the case instrument, needles, and sponge counts were correct. Patient tolerated the procedure well. I was present for the entire procedure., A qualified resident physician was not available. and A physician assistant was required during the procedure for retraction, tissue handling, dissection and suturing.     Patient Disposition:  PACU , hemodynamically stable and extubated and stable        SIGNATURE: Ghanshyam Dalal MD  DATE: September 27, 2023  TIME: 5:59 PM

## 2023-09-28 ENCOUNTER — TELEPHONE (OUTPATIENT)
Dept: UROLOGY | Facility: CLINIC | Age: 58
End: 2023-09-28

## 2023-09-28 ENCOUNTER — TELEPHONE (OUTPATIENT)
Age: 58
End: 2023-09-28

## 2023-09-28 VITALS
SYSTOLIC BLOOD PRESSURE: 132 MMHG | DIASTOLIC BLOOD PRESSURE: 74 MMHG | HEIGHT: 70 IN | HEART RATE: 78 BPM | BODY MASS INDEX: 28.75 KG/M2 | OXYGEN SATURATION: 94 % | RESPIRATION RATE: 22 BRPM | TEMPERATURE: 98.7 F | WEIGHT: 200.84 LBS

## 2023-09-28 PROBLEM — K81.0 ACUTE CHOLECYSTITIS: Status: RESOLVED | Noted: 2023-09-27 | Resolved: 2023-09-28

## 2023-09-28 LAB
ALBUMIN SERPL BCP-MCNC: 3.9 G/DL (ref 3.5–5)
ALP SERPL-CCNC: 92 U/L (ref 34–104)
ALT SERPL W P-5'-P-CCNC: 37 U/L (ref 7–52)
ANION GAP SERPL CALCULATED.3IONS-SCNC: 5 MMOL/L
AST SERPL W P-5'-P-CCNC: 44 U/L (ref 13–39)
BILIRUB SERPL-MCNC: 0.92 MG/DL (ref 0.2–1)
BUN SERPL-MCNC: 9 MG/DL (ref 5–25)
CALCIUM SERPL-MCNC: 9.1 MG/DL (ref 8.4–10.2)
CHLORIDE SERPL-SCNC: 102 MMOL/L (ref 96–108)
CO2 SERPL-SCNC: 29 MMOL/L (ref 21–32)
CREAT SERPL-MCNC: 0.69 MG/DL (ref 0.6–1.3)
ERYTHROCYTE [DISTWIDTH] IN BLOOD BY AUTOMATED COUNT: 12 % (ref 11.6–15.1)
GFR SERPL CREATININE-BSD FRML MDRD: 105 ML/MIN/1.73SQ M
GLUCOSE SERPL-MCNC: 147 MG/DL (ref 65–140)
HCT VFR BLD AUTO: 47.7 % (ref 36.5–49.3)
HGB BLD-MCNC: 16.2 G/DL (ref 12–17)
MCH RBC QN AUTO: 31.3 PG (ref 26.8–34.3)
MCHC RBC AUTO-ENTMCNC: 34 G/DL (ref 31.4–37.4)
MCV RBC AUTO: 92 FL (ref 82–98)
PLATELET # BLD AUTO: 201 THOUSANDS/UL (ref 149–390)
PLATELET # BLD AUTO: 215 THOUSANDS/UL (ref 149–390)
PMV BLD AUTO: 9.1 FL (ref 8.9–12.7)
PMV BLD AUTO: 9.2 FL (ref 8.9–12.7)
POTASSIUM SERPL-SCNC: 3.8 MMOL/L (ref 3.5–5.3)
PROT SERPL-MCNC: 6.8 G/DL (ref 6.4–8.4)
PSA SERPL-MCNC: 17.98 NG/ML (ref 0–4)
RBC # BLD AUTO: 5.18 MILLION/UL (ref 3.88–5.62)
SODIUM SERPL-SCNC: 136 MMOL/L (ref 135–147)
WBC # BLD AUTO: 12.91 THOUSAND/UL (ref 4.31–10.16)

## 2023-09-28 PROCEDURE — 99024 POSTOP FOLLOW-UP VISIT: CPT | Performed by: PHYSICIAN ASSISTANT

## 2023-09-28 PROCEDURE — 99254 IP/OBS CNSLTJ NEW/EST MOD 60: CPT | Performed by: UROLOGY

## 2023-09-28 PROCEDURE — 85027 COMPLETE CBC AUTOMATED: CPT | Performed by: PHYSICIAN ASSISTANT

## 2023-09-28 PROCEDURE — 84153 ASSAY OF PSA TOTAL: CPT | Performed by: PHYSICIAN ASSISTANT

## 2023-09-28 PROCEDURE — 80053 COMPREHEN METABOLIC PANEL: CPT | Performed by: PHYSICIAN ASSISTANT

## 2023-09-28 PROCEDURE — 85049 AUTOMATED PLATELET COUNT: CPT | Performed by: PHYSICIAN ASSISTANT

## 2023-09-28 RX ORDER — DOCUSATE SODIUM 100 MG/1
100 CAPSULE, LIQUID FILLED ORAL 2 TIMES DAILY
Qty: 14 CAPSULE | Refills: 0 | Status: SHIPPED | OUTPATIENT
Start: 2023-09-28 | End: 2023-09-28

## 2023-09-28 RX ORDER — DOCUSATE SODIUM 100 MG/1
100 CAPSULE, LIQUID FILLED ORAL 2 TIMES DAILY
Qty: 28 CAPSULE | Refills: 0 | Status: SHIPPED | OUTPATIENT
Start: 2023-09-28 | End: 2023-10-12

## 2023-09-28 RX ORDER — OXYCODONE HYDROCHLORIDE 5 MG/1
5 TABLET ORAL EVERY 4 HOURS PRN
Qty: 12 TABLET | Refills: 0 | Status: SHIPPED | OUTPATIENT
Start: 2023-09-28 | End: 2023-09-28

## 2023-09-28 RX ORDER — HYDROCODONE BITARTRATE AND ACETAMINOPHEN 5; 325 MG/1; MG/1
1 TABLET ORAL EVERY 6 HOURS PRN
Qty: 10 TABLET | Refills: 0 | Status: SHIPPED | OUTPATIENT
Start: 2023-09-28 | End: 2023-10-08

## 2023-09-28 RX ADMIN — DOCUSATE SODIUM 100 MG: 100 CAPSULE, LIQUID FILLED ORAL at 08:55

## 2023-09-28 RX ADMIN — SODIUM CHLORIDE, SODIUM LACTATE, POTASSIUM CHLORIDE, AND CALCIUM CHLORIDE 75 ML/HR: .6; .31; .03; .02 INJECTION, SOLUTION INTRAVENOUS at 08:55

## 2023-09-28 RX ADMIN — HEPARIN SODIUM 5000 UNITS: 5000 INJECTION INTRAVENOUS; SUBCUTANEOUS at 05:37

## 2023-09-28 RX ADMIN — AMLODIPINE BESYLATE 10 MG: 10 TABLET ORAL at 08:55

## 2023-09-28 RX ADMIN — ACETAMINOPHEN 650 MG: 325 TABLET, FILM COATED ORAL at 05:37

## 2023-09-28 RX ADMIN — ACETAMINOPHEN 650 MG: 325 TABLET, FILM COATED ORAL at 12:28

## 2023-09-28 NOTE — TELEPHONE ENCOUNTER
Patient is Bengali speaking and needs  or employee who is fluent to call him    We saw him as consult while at [place] for renal mass on L kidney. He also has large prostate on imaging, bony sclerotic lesions, and PSA of 17, so it looks like he has metastatic prostate cancer as well. He is apparently leaving for [place] within a few weeks. He left hospital before having these results really reviewed with him. Can someone call him and make sure he is aware that urology team saw him inpatient and that we are concerned that he has L renal mass which could be concerning for cancer AND he has image and lab findings that are concerning for metastatic prostate cancer. If he is leaving soon no matter what and cannot set up insurance here, he needs to know that it is very urgent that he sees urologist in [place] shortly after he returns. If he is planning on staying in [facility] and is getting insurance sorted out, he should see someone at the [address] [place] [address],[facility] [address] office within the next 2 weeks.

## 2023-09-28 NOTE — CONSULTS
[address] [facility] NOTE   Admission Date: 9/27/2023    Patient Identifiers: Val Renner (MRN: [de-identified])  Service Requesting Consultation: Lisa Doshi MD  Service Providing Consultation:  Urology, Debra Dove PA-C  Consults  Date of Service: 9/28/2023    Reason for Consultation: Left renal mass    History of Present Illness:     Val Renner is a 63 y.o. Palestinian-speaking male who presented to the hospital with acute onset of epigastric pain radiating to the right upper quadrant with associated nausea. He has a history of BPH. He has a history of hypertension diabetes hyperlipidemia. Imaging showed prominent gallstones with a diagnosis of acute cholecystitis. He is now postop day 1 from a laparoscopic cholecystectomy. His CAT scan shows a 0.7 cm cystic and solid lower pole mass in the left kidney. There are also scattered foci throughout the osseous structures possibly representing metastatic disease. WBC 12.91. Creat 0.69.     Past Medical, Past Surgical History:     Past Medical History:   Diagnosis Date   • Diabetes type 2, controlled ([address])    • Hyperlipidemia    • Hypertension    :    Past Surgical History:   Procedure Laterality Date   • CHOLECYSTECTOMY LAPAROSCOPIC N/A 9/27/2023    Procedure: CHOLECYSTECTOMY LAPAROSCOPIC;  Surgeon: Lisa Doshi MD;  Location: [facility];  Service: General   :    Medications, Allergies:     Current Facility-Administered Medications:   •  acetaminophen (TYLENOL) tablet 650 mg, 650 mg, Oral, Q6H [address], Pamela Ni, PA-C, 650 mg at 09/28/23 0537  •  amLODIPine (NORVASC) tablet 10 mg, 10 mg, Oral, Daily, Pamela Ni, PA-C, 10 mg at 09/28/23 0855  •  docusate sodium (COLACE) capsule 100 mg, 100 mg, Oral, BID, Pamela Ni, PA-C, 100 mg at 09/28/23 0855  •  heparin (porcine) subcutaneous injection 5,000 Units, 5,000 Units, Subcutaneous, Q8H [address], 5,000 Units at 09/28/23 0537 **AND** [COMPLETED] Platelet count, , , Once, [facility], PA-C  •  hydrALAZINE (APRESOLINE) injection 5 mg, 5 mg, Intravenous, Q6H PRN, Pamela Alina, PA-C  •  HYDROmorphone (DILAUDID) injection 0.5 mg, 0.5 mg, Intravenous, Q4H PRN, Pamela Alina, PA-C  •  lactated ringers bolus 1,000 mL, 1,000 mL, Intravenous, Once PRN **AND** lactated ringers bolus 1,000 mL, 1,000 mL, Intravenous, Once PRN, Vishal Speed Alina, PA-C  •  lactated ringers infusion, 75 mL/hr, Intravenous, Continuous, Pamela Ni, PA-C, Last Rate: 75 mL/hr at 09/28/23 0855, 75 mL/hr at 09/28/23 0855  •  ondansetron (ZOFRAN) injection 4 mg, 4 mg, Intravenous, Q6H PRN, Vishal Speed Alina, PA-C  •  oxyCODONE (ROXICODONE) immediate release tablet 10 mg, 10 mg, Oral, Q4H PRN, Vishal Speed Alina, PA-C  •  oxyCODONE (ROXICODONE) IR tablet 5 mg, 5 mg, Oral, Q4H PRN, Pamela Ni, PA-C, 5 mg at 09/27/23 2002  •  sodium chloride 0.9 % bolus 1,000 mL, 1,000 mL, Intravenous, Once PRN **AND** sodium chloride 0.9 % bolus 1,000 mL, 1,000 mL, Intravenous, Once PRN, Vishal Speed Alina, PA-C  •  traMADol (ULTRAM) tablet 50 mg, 50 mg, Oral, Q6H PRN, Vishal Speed Ni, PA-C    Allergies:  No Known Allergies:    Social and Family History:   Social History:   Social History     Tobacco Use   • Smoking status: Some Days     Types: Cigarettes   • Smokeless tobacco: Never   Vaping Use   • Vaping Use: Never used   Substance Use Topics   • Alcohol use: Yes     Comment: socially only   . Social History     Tobacco Use   Smoking Status Some Days   • Types: Cigarettes   Smokeless Tobacco Never       Family History:  History reviewed. No pertinent family history.:     Review of Systems:     General: Fever, chills, or night sweats: negative  Cardiac: Negative for chest pain. Pulmonary: Negative for shortness of breath. Gastrointestinal: Abdominal pain negative. Nausea, vomiting, or diarrhea negative,  Genitourinary: See HPI above. Patient does not have hematuria. All other systems queried were negative.     Physical Exam:   General: Patient is pleasant and in NAD. Awake and alert  /74 (BP Location: Right arm)   Pulse 78   Temp 98.7 °F (37.1 °C) (Oral)   Resp 22   Ht 5' 10" (1.778 m)   Wt 91.1 kg (200 lb 13.4 oz)   SpO2 94%   BMI 28.82 kg/m²   HEENT:  Conjunctiva are clear  Constitutional:  pleasant and cooperative     no apparent distress  Cardiac: Peripheral edema: negative  Pulmonary: Non-labored breathing  Abdomen: Soft, non-tender, non-distended. No surgical scars. No masses, tenderness, hernias noted. Genitourinary: Negative CVA tenderness, negative suprapubic tenderness. Extremities:  Moves all extremities  Neurological:CNII-XII intact. No numbness or tingling. Essentially non focal neurologic exam  Psychiatric:mood affect and behavior normal      Labs:     Lab Results   Component Value Date    HGB 16.2 09/28/2023    HCT 47.7 09/28/2023    WBC 12.91 (H) 09/28/2023     09/28/2023     09/28/2023   ]    Lab Results   Component Value Date    K 3.8 09/28/2023     09/28/2023    CO2 29 09/28/2023    BUN 9 09/28/2023    CREATININE 0.69 09/28/2023    CALCIUM 9.1 09/28/2023   ]    Imaging:   I personally reviewed the images and report of the following studies, and reviewed them with the patient:  IMPRESSION:     Prominent gallstone in the gallbladder neck with gallbladder distention and suggestion of questionable pericholecystic stranding surrounding the gallbladder neck. In this patient with right upper quadrant abdominal pain further investigation with right   upper quadrant ultrasound is recommended. Incidental discovery of cystic and solid 3.7 cm exophytic mid to lower pole left renal mass most consistent with renal cell carcinoma. Atypical appearance of right renal nephrogram with relative hypoenhancement of renal pyramids. This is a nonspecific finding that can be seen in the setting of pyelonephritis. Follow-up urinalysis is recommended. Marked prostatomegaly. Heterogeneous enhancement of prostate.      Scattered sclerotic foci seen throughout the osseous structures which could represent numerous bone islands but the possibility of sclerotic osseous metastatic disease must be considered. A follow-up nonemergent bone scan is recommended. ASSESSMENT:     #1.  3.7 cm left renal mass  #2. Suspicion for osseous metastatic disease  3. POD#1 laparoscopic cholecystectomy    PLAN:   -Renal masses suspicious but would recommend follow-up imaging with MRI and discussion with a urologic surgeon for possible surgery  -Unclear whether the osseous changes represent metastatic malignancy was present. I did order a PSA he has a very large and heterogeneous prostate on his CAT scan  -I did attempt to call his son and his daughter without success  -Recommend urology follow-up in about 6 weeks with an MRI renal protocol prior to visit  -If he is planning to leave for [facility] as long as he understands he needs to follow-up with a urologist there      Thank you for allowing me to participate in this patients’ care. Please do not hesitate to call with any additional questions.   Iris Dvoe PA-C

## 2023-09-28 NOTE — DISCHARGE INSTR - AVS FIRST PAGE
Follow up: Following discharge from the hospital call the office in 1-2 days to set up a post operative appointment to be seen in 2 weeks by Dr. Elpidio Conner or by a surgeon closer to your home    [phone]  Call the office if you have increased pain not relieved with pain medicine. Call the office if you have a fever,redness, the wound opens up, you have pus draining from your incision. AFTER YOU LEAVE: Following discharge from the hospital, you may have some questions about your procedure, your activities or your general condition. These instructions may answer some of your questions and help you adjust during the first few days following your operation. You can expect to be sore and tender mostly around the incisions. This pain should last approximally 5 days and gradually improve daily. Incisions:    - You may apply ice to the incisions to help with pain. Avoid heat as this may make the glue tacky   - It is normal to have some bruising, swelling or mild discoloration around the incision. If increasing redness or pain develops, call our office immediately. Do not apply any creams, lotions, or ointments. If you have dressings:  - You may remove the gauze dressing from your incisions 48 hours after surgery. Underneath this dressing is a tape like dressing called Steri Strips. Leave the steri strips in place for 5-7 days. They may fall off on their own. This is OK. Bathing:   - You may shower daily with soap and water the day after the procedure. It is OK to GENTLY wash the incision with soap and water then pat dry. DO NOT SCRUB. Do NOT soak incision in a tub, pool, or hot tub for 2 weeks. Diet:   - Resume your normal diet unless specified otherwise. We recommend you slowly advance your diet. Try to start with softer bland foods and gradually advance as tolerated. Be sure to consume plenty of water. Avoid alcohol.         Activity/Restrictions:   - The evening following the procedure you should rest as much as possible, sitting, lying or reclining. you should be sure someone remains with you until the next morning. Gradually increase your activity daily. Walking 3-4 times daily is good and stairs are ok. Listen to your body. If you start to get tired or sore then rest.   - No strenuous activity or exercise for 3-4 weeks. - No heavy lifting, pushing or pulling.   - No driving for 5 days or while taking narcotics for pain. Return or work: You may return to work or other activities as soon as your pain is controlled and you feel comfortable. For many people, this is 5 to 7 days after surgery. If your job requires heavy lifting you will need to be on light duty for 2-3 weeks. Medication:   - If you were given a prescription for Percocet, Norco, or Vicodin for pain be sure to eat prior to taking as these medications as they may cause nausea and vomiting on an empty stomach.    - If you do not want to take stronger medications for pain, you may take Tylenol, Aleve OR Ibuprofen  - DO NOT take Tylenol  (acetaminophen) with these medication for a fever or for further pain control as these medications already contain Tylenol in them. Take one or the other. Do not exceed more than 4000 mg of acetaminophen in 24 hours or 3000 mg if you have liver disease. - If you were given an antibiotic take it until it is finished. Contact your healthcare provider if:   You have a fever over 101°F (38°C) or chills. You have pain or nausea that is not relieved by medicine. You have redness and swelling around your incisions, or blood or pus is leaking from your incisions. You are constipated or have diarrhea. Your skin or eyes are yellow, or your bowel movements are pale. You have questions or concerns about your surgery, condition, or care. Seek care immediately or call 911 if:   You cannot stop vomiting. Your bowel movements are black or bloody.     You have pain in your abdomen and it is swollen or hard. Your arm or leg feels warm, tender, and painful. It may look swollen and red. You feel lightheaded, short of breath, and have chest pain. You cough up blood.

## 2023-09-28 NOTE — UTILIZATION REVIEW
Initial Clinical Review    Admission: Date/Time/Statement:   Admission Orders (From admission, onward)     Ordered        09/27/23 1504  Inpatient Admission  Once            09/27/23 1348  INPATIENT ADMISSION  Once                      Orders Placed This Encounter   Procedures   • INPATIENT ADMISSION     Standing Status:   Standing     Number of Occurrences:   1     Order Specific Question:   Level of Care     Answer:   Med Surg [16]     Order Specific Question:   Estimated length of stay     Answer:   More than 2 Midnights     Order Specific Question:   Certification     Answer:   I certify that inpatient services are medically necessary for this patient for a duration of greater than two midnights. See H&P and MD Progress Notes for additional information about the patient's course of treatment. • Inpatient Admission     Standing Status:   Standing     Number of Occurrences:   1     Order Specific Question:   Level of Care     Answer:   Med Surg [16]     Order Specific Question:   Estimated length of stay     Answer:   More than 2 Midnights     Order Specific Question:   Certification     Answer:   I certify that inpatient services are medically necessary for this patient for a duration of greater than two midnights. See H&P and MD Progress Notes for additional information about the patient's course of treatment. ED Arrival Information     Expected   9/27/2023     Arrival   9/27/2023 10:13    Acuity   Emergent            Means of arrival   Walk-In    Escorted by   Family Member    Service   Oncology-Surgical    Admission type   Emergency            Arrival complaint   Chest Pain           Chief Complaint   Patient presents with   • Chest Pain     Pt with chest pain and right sided back pain that started this morning, was sent over by urgent care.        Initial Presentation: 63 y.o. male to ED from home w/ acute onset  Of epigastric pain radiating to RUQ assoc w/ nausea began around 3 am . Took ibuprofen w/o relief . Went to Urgent care  EKG was done and referred to ED . PMHX HTN , HLD, DM , BPH . Imaging shows prominent gallstone in the gallbladder neck with gallbladder distention and questionable pericholecystic stranding surrounding the gallbladder neck. Solid Left renal cyst measuring 3.7 cm which is consistent with renal cell carcinoma. scattered sclerotic foci seen throughout the osseous structures which could represent metastatic disease. Admitted IP status w/ acute cholecystitis . Plan for IR eval of images w. Distended GB . Consult urology for renal cyst possible mets . Consider lap choley versus percutaneous cholecystostomy tube placement. Start IV abx , pain and nausea control and monitor vitals and labs. 9/27 OP Note   Lap choley  Operative Findings:  Gallbladder was markedly distended, tense with gangrenous changes throughout, no evidence of perforation. Adhesions between gallbladder and omentum. Liver surface appeared normal.  The rest of the abdominal cavity showed no evidence of inflammatory or neoplastic process.     ED Triage Vitals   Temperature Pulse Respirations Blood Pressure SpO2   09/27/23 1023 09/27/23 1023 09/27/23 1023 09/27/23 1023 09/27/23 1023   98.5 °F (36.9 °C) (!) 53 16 (!) 177/73 99 %      Temp Source Heart Rate Source Patient Position - Orthostatic VS BP Location FiO2 (%)   09/27/23 1655 09/27/23 1314 09/27/23 1150 09/27/23 1150 --   Temporal Monitor Lying Left arm       Pain Score       09/27/23 1122       10 - Worst Possible Pain          Wt Readings from Last 1 Encounters:   09/27/23 91.1 kg (200 lb 13.4 oz)     Additional Vital Signs:   09/28/23 08:04:59 98.7 °F (37.1 °C) 78 22 132/74 93 94 % -- -- -- Lying   09/28/23 05:15:49 99 °F (37.2 °C) 65 -- -- -- 97 % -- -- -- --   09/28/23 02:43:29 98.8 °F (37.1 °C) 73 16 138/82 101 97 % -- -- -- Lying   09/27/23 22:40:59 99.8 °F (37.7 °C) 69 16 130/73 92 93 % -- -- -- --   09/27/23 2100 99.1 °F (37.3 °C) 73 16 135/73 94 90 % -- None (Room air) -- Lying   09/27/23 2002 -- -- -- -- -- -- -- -- -- Lying   09/27/23 1958 -- 60 -- 160/80 107 94 % -- -- -- --   09/27/23 19:18:50 98.9 °F (37.2 °C) 66 16 167/81 110 87 % Abnormal  -- -- -- --   09/27/23 1900 99.3 °F (37.4 °C) 75 22 152/80 110 97 % -- None (Room air) WDL --   09/27/23 1845 -- 73 19 169/76 109 91 % -- None (Room air) WDL --   09/27/23 1830 -- 82 29 Abnormal  164/79 114 99 % -- None (Room air) WDL --   09/27/23 1815 98.2 °F (36.8 °C) 88 22 161/75 108 99 % 6 L/min Simple mask  WDL --   O2 Device: OG in place at 09/27/23 1815   09/27/23 1655 99.4 °F (37.4 °C) 71 16 173/80 Abnormal  -- 97 % -- None (Room air) -- --   09/27/23 1314 -- 75 16 131/69 -- 97 % -- None (Room air) -- Lying   09/27/23 1156 -- -- -- -- -- -- -- None (Room air) -- --   09/27/23 1150 -- 64 16 182/61 Abnormal  -- 98 % -- None (Room          Pertinent Labs/Diagnostic Test Results:   CT chest abdomen pelvis w contrast   Final Result by Ron Bolden MD (09/27 1300)      Prominent gallstone in the gallbladder neck with gallbladder distention and suggestion of questionable pericholecystic stranding surrounding the gallbladder neck. In this patient with right upper quadrant abdominal pain further investigation with right    upper quadrant ultrasound is recommended. Incidental discovery of cystic and solid 3.7 cm exophytic mid to lower pole left renal mass most consistent with renal cell carcinoma. Atypical appearance of right renal nephrogram with relative hypoenhancement of renal pyramids. This is a nonspecific finding that can be seen in the setting of pyelonephritis. Follow-up urinalysis is recommended. Marked prostatomegaly. Heterogeneous enhancement of prostate. Scattered sclerotic foci seen throughout the osseous structures which could represent numerous bone islands but the possibility of sclerotic osseous metastatic disease must be considered. A follow-up nonemergent bone scan is recommended. I reported these results to [facility] via HIPAA compliant secure electronic messaging on 9/27/2023 12:59 PM.      Workstation performed: [id number]         XR chest pa & lateral   Final Result by Sylvia Looney MD (09/27 1054)      No acute cardiopulmonary disease.                   Workstation performed: [id number]               Results from last 7 days   Lab Units 09/28/23  0514 09/27/23  1025   WBC Thousand/uL 12.91* 19.14*   HEMOGLOBIN g/dL 16.2 17.7*   HEMATOCRIT % 47.7 51.1*   PLATELETS Thousands/uL 215  201 255   NEUTROS ABS Thousands/µL  --  16.96*         Results from last 7 days   Lab Units 09/28/23  0514 09/27/23  1025   SODIUM mmol/L 136 138   POTASSIUM mmol/L 3.8 3.7   CHLORIDE mmol/L 102 102   CO2 mmol/L 29 27   ANION GAP mmol/L 5 9   BUN mg/dL 9 13   CREATININE mg/dL 0.69 0.78   EGFR ml/min/1.73sq m 105 100   CALCIUM mg/dL 9.1 9.9     Results from last 7 days   Lab Units 09/28/23  0514 09/27/23  1025   AST U/L 44* 20   ALT U/L 37 24   ALK PHOS U/L 92 111*   TOTAL PROTEIN g/dL 6.8 7.9   ALBUMIN g/dL 3.9 4.5   TOTAL BILIRUBIN mg/dL 0.92 0.56     Results from last 7 days   Lab Units 09/27/23  1819 09/27/23  1709   POC GLUCOSE mg/dl 159* 180*     Results from last 7 days   Lab Units 09/28/23  0514 09/27/23  1025   GLUCOSE RANDOM mg/dL 147* 162*       Results from last 7 days   Lab Units 09/27/23  1210 09/27/23  1025   HS TNI 0HR ng/L  --  10   HS TNI 2HR ng/L 9  --    HSTNI D2 ng/L -1  --        ED Treatment:   Medication Administration from 09/27/2023 0944 to 09/27/2023 1645       Date/Time Order Dose Route Action     09/27/2023 1122 EDT morphine injection 4 mg 4 mg Intravenous Given     09/27/2023 1159 EDT HYDROmorphone (DILAUDID) injection 1 mg 1 mg Intravenous Given        Past Medical History:   Diagnosis Date   • Diabetes type 2, controlled ([address])    • Hyperlipidemia    • Hypertension      Present on Admission:  • Acute cholecystitis      Admitting Diagnosis: Acute cholecystitis [K81.0]  Back pain [M54.9]  Cholelithiases [K80.20]  Renal mass [N28.89]  Abnormal CT of the abdomen [R93.5]  Age/Sex: 63 y.o. male  Admission Orders:  Scheduled Medications:  acetaminophen, 650 mg, Oral, Q6H SERGEY  amLODIPine, 10 mg, Oral, Daily  docusate sodium, 100 mg, Oral, BID  heparin (porcine), 5,000 Units, Subcutaneous, Q8H [facility]      Continuous IV Infusions:  lactated ringers, 75 mL/hr, Intravenous, Continuous      PRN Meds:  hydrALAZINE, 5 mg, Intravenous, Q6H PRN  HYDROmorphone, 0.5 mg, Intravenous, Q4H PRN  lactated ringers, 1,000 mL, Intravenous, Once PRN   And  lactated ringers, 1,000 mL, Intravenous, Once PRN  ondansetron, 4 mg, Intravenous, Q6H PRN  oxyCODONE, 10 mg, Oral, Q4H PRN  oxyCODONE, 5 mg, Oral, Q4H PRN  sodium chloride, 1,000 mL, Intravenous, Once PRN   And  sodium chloride, 1,000 mL, Intravenous, Once PRN  traMADol, 50 mg, Oral, Q6H PRN    Cont pulse ox   Surgical soft diet   I&O   Up as julian   NPO   Tele     INPATIENT CONSULT TO IR  IP CONSULT TO UROLOGY  IP CONSULT TO INTERNAL MEDICINE    Network Utilization Review Department  ATTENTION: Please call with any questions or concerns to [phone] and carefully listen to the prompts so that you are directed to the right person. All voicemails are confidential.  Jay Hdez all requests for admission clinical reviews, approved or denied determinations and any other requests to dedicated fax number below belonging to the campus where the patient is receiving treatment.  List of dedicated fax numbers for the Facilities:  [place] DENIALS (Administrative/Medical Necessity) [phone] 2303 [facility] (Maternity/NICU/Pediatrics) [address] [place] [address] [phone] [address]5 [address] [phone]   [facility] [address] [address] [facility] [address] [facility]  [address]  [phone]

## 2023-09-28 NOTE — PROGRESS NOTES
Progress Note - General Surgery   Derrick Page 63 y.o. male MRN: [de-identified]  Unit/Bed#: -01 Encounter: [id number]    Assessment/Plan  Derrick Page is a 63 y.o. male     POD1 s/p laparoscopic cholecystectomy for gangrenous cholecystitis  L renal mass suspicious for RCC  HTN, HLD, DMT2  AVSS, WBC 12.9 from 19, Hb 16.2  LFTs unremarkable  Cr 0.69  + flatus/BM, abdomen soft, nondistended, normoactive bowel sounds, appropriate incisional tenderness to palpation, incisions c/d/i covered in dressing     The patient evaluated  By Urology, appreciate their input. At this time, they are recommending follow up imaging with MRI and discussion with urologic surgeon regarding possible surgical intervention. PSA also ordered given findings of osseus changes possibly suggestive of metastatic malignancy and evidence of large and herterogenous prostate on CT. CM consulted for disposition assistance. As the patient is currently a resident in [facility] and is scheduled to fly back home on Monday, will need to obtain medical records to continue treatment once [place]. The patient is doing well from surgical standpoint, ok for discharge home this afternoon once disposition planning is completed. Patient will require follow up in 2 weeks with a surgeon for routine postoperative check. All discharge instructions were reviewed with the patient prior to discharge. All questions and concerns were addressed prior to discharge. The patient expresses understannding in need for follow up with both his PCP, a Urologic surgeon, and a General Surgeon once he returns back to home [place]    Subjective/Objective    Subjective: No acute events overnight     Objective:     Blood pressure 132/74, pulse 78, temperature 98.7 °F (37.1 °C), temperature source Oral, resp. rate 22, height 5' 10" (1.778 m), weight 91.1 kg (200 lb 13.4 oz), SpO2 94 %. ,Body mass index is 28.82 kg/m².       Intake/Output Summary (Last 24 hours) at 9/28/2023 1052  Last data filed at 9/28/2023 0804  Gross per 24 hour   Intake 1120 ml   Output 600 ml   Net 520 ml       Invasive Devices     Peripheral Intravenous Line  Duration           Peripheral IV 09/27/23 Distal;Left;Upper;Ventral (anterior) Arm <1 day                Physical Exam: /74 (BP Location: Right arm)   Pulse 78   Temp 98.7 °F (37.1 °C) (Oral)   Resp 22   Ht 5' 10" (1.778 m)   Wt 91.1 kg (200 lb 13.4 oz)   SpO2 94%   BMI 28.82 kg/m²   General appearance: alert and oriented, in no acute distress  Lungs: clear to auscultation bilaterally  Heart: regular rate and rhythm, S1, S2 normal, no murmur, click, rub or gallop  Abdomen: soft, nondistended, normoactive bowel sounds, appropriate incisional tenderness to palpation, incisions c/d/i covered in dressings  Extremities: extremities normal, warm and well-perfused; no cyanosis, clubbing, or edema    Lab, Imaging and other studies:I have personally reviewed pertinent lab results.      VTE Pharmacologic Prophylaxis: Heparin  VTE Mechanical Prophylaxis: sequential compression device    Recent Results (from the past 36 hour(s))   ECG 12 lead    Collection Time: 09/27/23  9:32 AM   Result Value Ref Range    Ventricular Rate 62 BPM    Atrial Rate 62 BPM    WV Interval 156 ms    QRSD Interval 142 ms    QT Interval 462 ms    QTC Interval 468 ms    P Axis 62 degrees    QRS Axis 11 degrees    T Wave Axis 21 degrees   ECG 12 lead    Collection Time: 09/27/23 10:21 AM   Result Value Ref Range    Ventricular Rate 51 BPM    Atrial Rate 51 BPM    WV Interval 156 ms    QRSD Interval 148 ms    QT Interval 470 ms    QTC Interval 433 ms    P Axis 72 degrees    QRS Axis 179 degrees    T Wave Axis 36 degrees   CBC and differential    Collection Time: 09/27/23 10:25 AM   Result Value Ref Range    WBC 19.14 (H) 4.31 - 10.16 Thousand/uL    RBC 5.62 3.88 - 5.62 Million/uL    Hemoglobin 17.7 (H) 12.0 - 17.0 g/dL    Hematocrit 51.1 (H) 36.5 - 49.3 % MCV 91 82 - 98 fL    MCH 31.5 26.8 - 34.3 pg    MCHC 34.6 31.4 - 37.4 g/dL    RDW 11.8 11.6 - 15.1 %    MPV 9.1 8.9 - 12.7 fL    Platelets 065 541 - 065 Thousands/uL    nRBC 0 /100 WBCs    Neutrophils Relative 89 (H) 43 - 75 %    Immat GRANS % 1 0 - 2 %    Lymphocytes Relative 6 (L) 14 - 44 %    Monocytes Relative 4 4 - 12 %    Eosinophils Relative 0 0 - 6 %    Basophils Relative 0 0 - 1 %    Neutrophils Absolute 16.96 (H) 1.85 - 7.62 Thousands/µL    Immature Grans Absolute 0.10 0.00 - 0.20 Thousand/uL    Lymphocytes Absolute 1.14 0.60 - 4.47 Thousands/µL    Monocytes Absolute 0.84 0.17 - 1.22 Thousand/µL    Eosinophils Absolute 0.04 0.00 - 0.61 Thousand/µL    Basophils Absolute 0.06 0.00 - 0.10 Thousands/µL   Comprehensive metabolic panel    Collection Time: 09/27/23 10:25 AM   Result Value Ref Range    Sodium 138 135 - 147 mmol/L    Potassium 3.7 3.5 - 5.3 mmol/L    Chloride 102 96 - 108 mmol/L    CO2 27 21 - 32 mmol/L    ANION GAP 9 mmol/L    BUN 13 5 - 25 mg/dL    Creatinine 0.78 0.60 - 1.30 mg/dL    Glucose 162 (H) 65 - 140 mg/dL    Calcium 9.9 8.4 - 10.2 mg/dL    AST 20 13 - 39 U/L    ALT 24 7 - 52 U/L    Alkaline Phosphatase 111 (H) 34 - 104 U/L    Total Protein 7.9 6.4 - 8.4 g/dL    Albumin 4.5 3.5 - 5.0 g/dL    Total Bilirubin 0.56 0.20 - 1.00 mg/dL    eGFR 100 ml/min/1.73sq m   HS Troponin 0hr (reflex protocol)    Collection Time: 09/27/23 10:25 AM   Result Value Ref Range    hs TnI 0hr 10 "Refer to ACS Flowchart"- see link ng/L   HS Troponin I 2hr    Collection Time: 09/27/23 12:10 PM   Result Value Ref Range    hs TnI 2hr 9 "Refer to ACS Flowchart"- see link ng/L    Delta 2hr hsTnI -1 <20 ng/L   Fingerstick Glucose (POCT)    Collection Time: 09/27/23  5:09 PM   Result Value Ref Range    POC Glucose 180 (H) 65 - 140 mg/dl   Fingerstick Glucose (POCT)    Collection Time: 09/27/23  6:19 PM   Result Value Ref Range    POC Glucose 159 (H) 65 - 140 mg/dl   Platelet count    Collection Time: 09/28/23  5:14 AM   Result Value Ref Range    Platelets 810 614 - 857 Thousands/uL    MPV 9.1 8.9 - 12.7 fL   Comprehensive metabolic panel    Collection Time: 09/28/23  5:14 AM   Result Value Ref Range    Sodium 136 135 - 147 mmol/L    Potassium 3.8 3.5 - 5.3 mmol/L    Chloride 102 96 - 108 mmol/L    CO2 29 21 - 32 mmol/L    ANION GAP 5 mmol/L    BUN 9 5 - 25 mg/dL    Creatinine 0.69 0.60 - 1.30 mg/dL    Glucose 147 (H) 65 - 140 mg/dL    Calcium 9.1 8.4 - 10.2 mg/dL    AST 44 (H) 13 - 39 U/L    ALT 37 7 - 52 U/L    Alkaline Phosphatase 92 34 - 104 U/L    Total Protein 6.8 6.4 - 8.4 g/dL    Albumin 3.9 3.5 - 5.0 g/dL    Total Bilirubin 0.92 0.20 - 1.00 mg/dL    eGFR 105 ml/min/1.73sq m   CBC    Collection Time: 09/28/23  5:14 AM   Result Value Ref Range    WBC 12.91 (H) 4.31 - 10.16 Thousand/uL    RBC 5.18 3.88 - 5.62 Million/uL    Hemoglobin 16.2 12.0 - 17.0 g/dL    Hematocrit 47.7 36.5 - 49.3 %    MCV 92 82 - 98 fL    MCH 31.3 26.8 - 34.3 pg    MCHC 34.0 31.4 - 37.4 g/dL    RDW 12.0 11.6 - 15.1 %    Platelets 142 116 - 173 Thousands/uL    MPV 9.2 8.9 - 12.7 fL

## 2023-09-28 NOTE — TELEPHONE ENCOUNTER
Pt calling from the hospital stating he got a call from our office. Pt is currently in the hospital.  Stated that he did not have a VM he just redialed the last number that called him as a missed call. There is no encounter that anyone from our office called pt.

## 2023-09-28 NOTE — PLAN OF CARE
Problem: MOBILITY - ADULT  Goal: Maintain or return to baseline ADL function  Description: INTERVENTIONS:  -  Assess patient's ability to carry out ADLs; assess patient's baseline for ADL function and identify physical deficits which impact ability to perform ADLs (bathing, care of mouth/teeth, toileting, grooming, dressing, etc.)  - Assess/evaluate cause of self-care deficits   - Assess range of motion  - Assess patient's mobility; develop plan if impaired  - Assess patient's need for assistive devices and provide as appropriate  - Encourage maximum independence but intervene and supervise when necessary  - Involve family in performance of ADLs  - Assess for home care needs following discharge   - Consider OT consult to assist with ADL evaluation and planning for discharge  - Provide patient education as appropriate  9/28/2023 1400 by Xiomara Delacruz  Outcome: Adequate for Discharge  9/28/2023 1400 by Xiomaar Delacruz  Outcome: Adequate for Discharge  9/28/2023 0932 by Xiomara Delacruz  Outcome: Progressing  Goal: Maintains/Returns to pre admission functional level  Description: INTERVENTIONS:  - Perform BMAT or MOVE assessment daily.   - Set and communicate daily mobility goal to care team and patient/family/caregiver. - Collaborate with rehabilitation services on mobility goals if consulted  - Perform Range of Motion 3 times a day. - Reposition patient every 2 hours.   - Dangle patient 3 times a day  - Stand patient 3 times a day  - Ambulate patient 3 times a day  - Out of bed to chair 3 times a day   - Out of bed for meals 3 times a day  - Out of bed for toileting  - Record patient progress and toleration of activity level   9/28/2023 1400 by Xiomara Delacruz  Outcome: Adequate for Discharge  9/28/2023 1400 by Xiomara Delacruz  Outcome: Adequate for Discharge  9/28/2023 0932 by Xiomara Delacruz  Outcome: Progressing     Problem: PAIN - ADULT  Goal: Verbalizes/displays adequate comfort level or baseline comfort level  Description: Interventions:  - Encourage patient to monitor pain and request assistance  - Assess pain using appropriate pain scale  - Administer analgesics based on type and severity of pain and evaluate response  - Implement non-pharmacological measures as appropriate and evaluate response  - Consider cultural and social influences on pain and pain management  - Notify physician/advanced practitioner if interventions unsuccessful or patient reports new pain  9/28/2023 1400 by Mohsen Shaw  Outcome: Adequate for Discharge  9/28/2023 1400 by Mohsen Shaw  Outcome: Adequate for Discharge  9/28/2023 0932 by Mohsen Shaw  Outcome: Progressing     Problem: INFECTION - ADULT  Goal: Absence or prevention of progression during hospitalization  Description: INTERVENTIONS:  - Assess and monitor for signs and symptoms of infection  - Monitor lab/diagnostic results  - Monitor all insertion sites, i.e. indwelling lines, tubes, and drains  - Monitor endotracheal if appropriate and nasal secretions for changes in amount and color  - [place] appropriate cooling/warming therapies per order  - Administer medications as ordered  - Instruct and encourage patient and family to use good hand hygiene technique  - Identify and instruct in appropriate isolation precautions for identified infection/condition  9/28/2023 1400 by Mohsen Shaw  Outcome: Adequate for Discharge  9/28/2023 1400 by Mohsen Shaw  Outcome: Adequate for Discharge  9/28/2023 0932 by Mohsen Shaw  Outcome: Progressing  Goal: Absence of fever/infection during neutropenic period  Description: INTERVENTIONS:  - Monitor WBC    9/28/2023 1400 by Mohsen Shaw  Outcome: Adequate for Discharge  9/28/2023 1400 by Mohsen Shaw  Outcome: Adequate for Discharge  9/28/2023 0932 by Mohsen Shaw  Outcome: Progressing     Problem: DISCHARGE PLANNING  Goal: Discharge to home or other facility with appropriate resources  Description: INTERVENTIONS:  - Identify barriers to discharge w/patient and caregiver  - Arrange for needed discharge resources and transportation as appropriate  - Identify discharge learning needs (meds, wound care, etc.)  - Arrange for interpretive services to assist at discharge as needed  - Refer to Case Management Department for coordinating discharge planning if the patient needs post-hospital services based on physician/advanced practitioner order or complex needs related to functional status, cognitive ability, or social support system  9/28/2023 1400 by Xiomara Delacruz  Outcome: Adequate for Discharge  9/28/2023 1400 by Xiomara Delacruz  Outcome: Adequate for Discharge  9/28/2023 0932 by Xiomara Delacruz  Outcome: Progressing

## 2023-09-29 ENCOUNTER — TELEPHONE (OUTPATIENT)
Dept: UROLOGY | Facility: CLINIC | Age: 58
End: 2023-09-29

## 2023-09-29 NOTE — TELEPHONE ENCOUNTER
Spoke with patient/family over the phone utilizing [id number] [address] [address] . Relayed imaging findings and need for further evaluation and treatment. Patient will be going back to [place] on 10/3/23. He is advised to see urologist as soon as possible when he returns to [facility]. Should this change and he remain in the [address], can see our office for further treatment.

## 2023-09-30 PROCEDURE — 88304 TISSUE EXAM BY PATHOLOGIST: CPT | Performed by: SPECIALIST

## 2023-10-04 ENCOUNTER — TELEPHONE (OUTPATIENT)
Dept: SURGERY | Facility: CLINIC | Age: 58
End: 2023-10-04

## 2023-10-04 NOTE — TELEPHONE ENCOUNTER
I called patient's number on file his daughter picked up Kota Patel. I stated to her needs a post op appointment from his surgery on 9/27/23 she stated he went to [facility] on 10/3/2023. I stated to her to make sure he sees a Primary doctor or surgeon in [facility]. She understood and confirmed.

## (undated) DEVICE — ALLENTOWN LAP CHOLE APP PACK: Brand: CARDINAL HEALTH

## (undated) DEVICE — NEPTUNE E-SEP SMOKE EVACUATION PENCIL, COATED, 70MM BLADE, PUSH BUTTON SWITCH: Brand: NEPTUNE E-SEP

## (undated) DEVICE — GLOVE INDICATOR PI UNDERGLOVE SZ 7.5 BLUE

## (undated) DEVICE — TROCAR: Brand: KII FIOS FIRST ENTRY

## (undated) DEVICE — 3M™ STERI-STRIP™ REINFORCED ADHESIVE SKIN CLOSURES, R1546, 1/4 IN X 4 IN (6 MM X 100 MM), 10 STRIPS/ENVELOPE: Brand: 3M™ STERI-STRIP™

## (undated) DEVICE — TUBING SMOKE EVAC W/FILTRATION DEVICE PLUMEPORT ACTIV

## (undated) DEVICE — LIGAMAX 5 MM ENDOSCOPIC MULTIPLE CLIP APPLIER: Brand: LIGAMAX

## (undated) DEVICE — TROCAR: Brand: KII® SLEEVE

## (undated) DEVICE — INTENDED FOR TISSUE SEPARATION, AND OTHER PROCEDURES THAT REQUIRE A SHARP SURGICAL BLADE TO PUNCTURE OR CUT.: Brand: BARD-PARKER SAFETY BLADES SIZE 15, STERILE

## (undated) DEVICE — SCD SEQUENTIAL COMPRESSION COMFORT SLEEVE MEDIUM KNEE LENGTH: Brand: KENDALL SCD

## (undated) DEVICE — 3M™ TEGADERM™ TRANSPARENT FILM DRESSING FRAME STYLE, 1624W, 2-3/8 IN X 2-3/4 IN (6 CM X 7 CM), 100/CT 4CT/CASE: Brand: 3M™ TEGADERM™

## (undated) DEVICE — TOWEL SET X-RAY

## (undated) DEVICE — ELECTRODE LAP L WIRE E-Z CLEAN 33CM -0100

## (undated) DEVICE — GLOVE SRG BIOGEL ECLIPSE 7.5

## (undated) DEVICE — CHLORAPREP HI-LITE 26ML ORANGE

## (undated) DEVICE — IV CATH 14 G X 1.75

## (undated) DEVICE — DRAPE EQUIPMENT RF WAND

## (undated) DEVICE — GAUZE SPONGES,8 PLY: Brand: CURITY

## (undated) DEVICE — 5 MM CURVED DISSECTORS WITH MONOPOLAR CAUTERY: Brand: ENDOPATH

## (undated) DEVICE — DRAPE C-ARM X-RAY

## (undated) DEVICE — LIGHT HANDLE COVER SLEEVE DISP BLUE STELLAR

## (undated) DEVICE — SUT VICRYL 4-0 PS-2 27 IN J426H

## (undated) DEVICE — [HIGH FLOW INSUFFLATOR,  DO NOT USE IF PACKAGE IS DAMAGED,  KEEP DRY,  KEEP AWAY FROM SUNLIGHT,  PROTECT FROM HEAT AND RADIOACTIVE SOURCES.]: Brand: PNEUMOSURE

## (undated) DEVICE — METZENBAUM ADTEC SINGLE USE DISSECTING SCISSORS, SHAFT ONLY, MONOPOLAR, CURVED TO LEFT, WORKING LENGTH: 12 1/4", (310 MM), DIAM. 5 MM, INSULATED, DOUBLE ACTION, STERILE, DISPOSABLE, PACKAGE OF 10 PIECES: Brand: AESCULAP

## (undated) DEVICE — SYRINGE 10ML LL

## (undated) DEVICE — HYDROPHILIC WOUND DRESSING WITH ZINC PLUS VITAMINS A AND B6.: Brand: DERMAGRAN®-B

## (undated) DEVICE — 4-PORT MANIFOLD: Brand: NEPTUNE 2

## (undated) DEVICE — COTTON TIP APPLICTOR 2 PK

## (undated) DEVICE — PAD GROUNDING ADULT

## (undated) DEVICE — TISSUE RETRIEVAL SYSTEM: Brand: INZII RETRIEVAL SYSTEM